# Patient Record
Sex: MALE | Race: WHITE | NOT HISPANIC OR LATINO | Employment: OTHER
[De-identification: names, ages, dates, MRNs, and addresses within clinical notes are randomized per-mention and may not be internally consistent; named-entity substitution may affect disease eponyms.]

---

## 2017-03-17 ENCOUNTER — PRIOR ORIGINAL RECORDS (OUTPATIENT)
Dept: OTHER | Age: 77
End: 2017-03-17

## 2017-07-19 ENCOUNTER — PRIOR ORIGINAL RECORDS (OUTPATIENT)
Dept: OTHER | Age: 77
End: 2017-07-19

## 2017-07-24 ENCOUNTER — TELEPHONE (OUTPATIENT)
Dept: INTERVENTIONAL RADIOLOGY/VASCULAR | Facility: HOSPITAL | Age: 77
End: 2017-07-24

## 2017-07-27 LAB
HDL CHOLESTEROL: 32 MG/DL
LDL CHOLESTEROL: 53 MG/DL
TRIGLYCERIDES: 66 MG/DL

## 2017-08-03 ENCOUNTER — APPOINTMENT (OUTPATIENT)
Dept: GENERAL RADIOLOGY | Facility: HOSPITAL | Age: 77
DRG: 287 | End: 2017-08-03
Attending: EMERGENCY MEDICINE
Payer: MEDICARE

## 2017-08-03 ENCOUNTER — APPOINTMENT (OUTPATIENT)
Dept: CV DIAGNOSTICS | Facility: HOSPITAL | Age: 77
DRG: 287 | End: 2017-08-03
Attending: HOSPITALIST
Payer: MEDICARE

## 2017-08-03 ENCOUNTER — HOSPITAL ENCOUNTER (INPATIENT)
Facility: HOSPITAL | Age: 77
LOS: 4 days | Discharge: HOME | DRG: 287 | End: 2017-08-07
Attending: EMERGENCY MEDICINE | Admitting: HOSPITALIST
Payer: MEDICARE

## 2017-08-03 DIAGNOSIS — R06.02 SHORTNESS OF BREATH: ICD-10-CM

## 2017-08-03 DIAGNOSIS — I50.9 ACUTE ON CHRONIC CONGESTIVE HEART FAILURE, UNSPECIFIED CONGESTIVE HEART FAILURE TYPE: Primary | ICD-10-CM

## 2017-08-03 DIAGNOSIS — R77.8 ELEVATED TROPONIN: ICD-10-CM

## 2017-08-03 PROBLEM — R79.89 ELEVATED TROPONIN: Status: ACTIVE | Noted: 2017-08-03

## 2017-08-03 LAB
ALBUMIN SERPL-MCNC: 4.1 G/DL (ref 3.5–4.8)
ALP LIVER SERPL-CCNC: 38 U/L (ref 45–117)
ALT SERPL-CCNC: 59 U/L (ref 17–63)
APTT PPP: 32.2 SECONDS (ref 25–34)
AST SERPL-CCNC: 37 U/L (ref 15–41)
ATRIAL RATE: 110 BPM
BASOPHILS # BLD AUTO: 0.02 X10(3) UL (ref 0–0.1)
BASOPHILS NFR BLD AUTO: 0.3 %
BILIRUB SERPL-MCNC: 0.4 MG/DL (ref 0.1–2)
BUN BLD-MCNC: 21 MG/DL (ref 8–20)
CALCIUM BLD-MCNC: 9.1 MG/DL (ref 8.3–10.3)
CHLORIDE: 106 MMOL/L (ref 101–111)
CO2: 26 MMOL/L (ref 22–32)
CREAT BLD-MCNC: 0.85 MG/DL (ref 0.7–1.3)
D-DIMER: 0.48 UG/ML FEU (ref 0–0.49)
EOSINOPHIL # BLD AUTO: 0.03 X10(3) UL (ref 0–0.3)
EOSINOPHIL NFR BLD AUTO: 0.4 %
ERYTHROCYTE [DISTWIDTH] IN BLOOD BY AUTOMATED COUNT: 14 % (ref 11.5–16)
GLUCOSE BLD-MCNC: 120 MG/DL (ref 70–99)
HCT VFR BLD AUTO: 39.5 % (ref 37–53)
HGB BLD-MCNC: 13.3 G/DL (ref 13–17)
IMMATURE GRANULOCYTE COUNT: 0.01 X10(3) UL (ref 0–1)
IMMATURE GRANULOCYTE RATIO %: 0.1 %
INR BLD: 1.11 (ref 0.89–1.11)
LYMPHOCYTES # BLD AUTO: 0.81 X10(3) UL (ref 0.9–4)
LYMPHOCYTES NFR BLD AUTO: 11.5 %
M PROTEIN MFR SERPL ELPH: 6.7 G/DL (ref 6.1–8.3)
MCH RBC QN AUTO: 32.9 PG (ref 27–33.2)
MCHC RBC AUTO-ENTMCNC: 33.7 G/DL (ref 31–37)
MCV RBC AUTO: 97.8 FL (ref 80–99)
MONOCYTES # BLD AUTO: 0.69 X10(3) UL (ref 0.1–0.6)
MONOCYTES NFR BLD AUTO: 9.8 %
NEUTROPHIL ABS PRELIM: 5.5 X10 (3) UL (ref 1.3–6.7)
NEUTROPHILS # BLD AUTO: 5.5 X10(3) UL (ref 1.3–6.7)
NEUTROPHILS NFR BLD AUTO: 77.9 %
P AXIS: 60 DEGREES
P-R INTERVAL: 154 MS
PLATELET # BLD AUTO: 181 10(3)UL (ref 150–450)
POTASSIUM SERPL-SCNC: 4.1 MMOL/L (ref 3.6–5.1)
PRO-BETA NATRIURETIC PEPTIDE: 8785 PG/ML (ref ?–450)
PSA SERPL DL<=0.01 NG/ML-MCNC: 14.3 SECONDS (ref 12–14.3)
Q-T INTERVAL: 366 MS
QRS DURATION: 140 MS
QTC CALCULATION (BEZET): 495 MS
R AXIS: -57 DEGREES
RBC # BLD AUTO: 4.04 X10(6)UL (ref 3.8–5.8)
RED CELL DISTRIBUTION WIDTH-SD: 50 FL (ref 35.1–46.3)
SODIUM SERPL-SCNC: 139 MMOL/L (ref 136–144)
T AXIS: 113 DEGREES
TROPONIN: 0.16 NG/ML (ref ?–0.05)
TROPONIN: 0.27 NG/ML (ref ?–0.05)
VENTRICULAR RATE: 110 BPM
WBC # BLD AUTO: 7.1 X10(3) UL (ref 4–13)

## 2017-08-03 PROCEDURE — 99223 1ST HOSP IP/OBS HIGH 75: CPT | Performed by: HOSPITALIST

## 2017-08-03 PROCEDURE — 93306 TTE W/DOPPLER COMPLETE: CPT | Performed by: HOSPITALIST

## 2017-08-03 PROCEDURE — 71020 XR CHEST PA + LAT CHEST (CPT=71020): CPT | Performed by: EMERGENCY MEDICINE

## 2017-08-03 RX ORDER — ASPIRIN 325 MG
325 TABLET ORAL DAILY
Status: DISCONTINUED | OUTPATIENT
Start: 2017-08-03 | End: 2017-08-07

## 2017-08-03 RX ORDER — FUROSEMIDE 10 MG/ML
40 INJECTION INTRAMUSCULAR; INTRAVENOUS ONCE
Status: COMPLETED | OUTPATIENT
Start: 2017-08-03 | End: 2017-08-03

## 2017-08-03 RX ORDER — ASPIRIN 81 MG/1
324 TABLET, CHEWABLE ORAL ONCE
Status: COMPLETED | OUTPATIENT
Start: 2017-08-04 | End: 2017-08-04

## 2017-08-03 RX ORDER — SODIUM CHLORIDE 9 MG/ML
INJECTION, SOLUTION INTRAVENOUS CONTINUOUS
Status: DISCONTINUED | OUTPATIENT
Start: 2017-08-03 | End: 2017-08-03

## 2017-08-03 RX ORDER — ALFUZOSIN HYDROCHLORIDE 10 MG/1
10 TABLET, EXTENDED RELEASE ORAL
Status: DISCONTINUED | OUTPATIENT
Start: 2017-08-03 | End: 2017-08-07

## 2017-08-03 RX ORDER — ACETAMINOPHEN 325 MG/1
650 TABLET ORAL EVERY 6 HOURS PRN
Status: DISCONTINUED | OUTPATIENT
Start: 2017-08-03 | End: 2017-08-07

## 2017-08-03 RX ORDER — SODIUM CHLORIDE 9 MG/ML
INJECTION, SOLUTION INTRAVENOUS CONTINUOUS
Status: DISCONTINUED | OUTPATIENT
Start: 2017-08-04 | End: 2017-08-04

## 2017-08-03 RX ORDER — BUTALBITAL, ASPIRIN, AND CAFFEINE 50; 325; 40 MG/1; MG/1; MG/1
1 CAPSULE ORAL EVERY 6 HOURS PRN
Status: DISCONTINUED | OUTPATIENT
Start: 2017-08-03 | End: 2017-08-07

## 2017-08-03 RX ORDER — ONDANSETRON 2 MG/ML
4 INJECTION INTRAMUSCULAR; INTRAVENOUS EVERY 6 HOURS PRN
Status: DISCONTINUED | OUTPATIENT
Start: 2017-08-03 | End: 2017-08-07

## 2017-08-03 RX ORDER — HYDROMORPHONE HYDROCHLORIDE 1 MG/ML
1 INJECTION, SOLUTION INTRAMUSCULAR; INTRAVENOUS; SUBCUTANEOUS ONCE
Status: COMPLETED | OUTPATIENT
Start: 2017-08-03 | End: 2017-08-03

## 2017-08-03 RX ORDER — ZOLPIDEM TARTRATE 10 MG/1
10 TABLET ORAL NIGHTLY
Status: DISCONTINUED | OUTPATIENT
Start: 2017-08-03 | End: 2017-08-07

## 2017-08-03 RX ORDER — BACLOFEN 20 MG/1
20 TABLET ORAL 4 TIMES DAILY
Status: DISCONTINUED | OUTPATIENT
Start: 2017-08-03 | End: 2017-08-07

## 2017-08-03 RX ORDER — CODEINE/BUTALBITAL/ASA/CAFFEIN 30-50-325
1 CAPSULE ORAL EVERY 6 HOURS PRN
Status: DISCONTINUED | OUTPATIENT
Start: 2017-08-03 | End: 2017-08-03 | Stop reason: SDUPTHER

## 2017-08-03 RX ORDER — AMLODIPINE BESYLATE 5 MG/1
10 TABLET ORAL DAILY
Status: DISCONTINUED | OUTPATIENT
Start: 2017-08-03 | End: 2017-08-05

## 2017-08-03 RX ORDER — DIAZEPAM 5 MG/1
5 TABLET ORAL 2 TIMES DAILY
Status: DISCONTINUED | OUTPATIENT
Start: 2017-08-03 | End: 2017-08-07

## 2017-08-03 RX ORDER — HYDROMORPHONE HYDROCHLORIDE 2 MG/1
8 TABLET ORAL EVERY 4 HOURS PRN
Status: DISCONTINUED | OUTPATIENT
Start: 2017-08-03 | End: 2017-08-07

## 2017-08-03 RX ORDER — CODEINE SULFATE 30 MG/1
30 TABLET ORAL EVERY 6 HOURS PRN
Status: DISCONTINUED | OUTPATIENT
Start: 2017-08-03 | End: 2017-08-07

## 2017-08-03 RX ORDER — BACLOFEN 20 MG/1
20 TABLET ORAL 3 TIMES DAILY
Status: DISCONTINUED | OUTPATIENT
Start: 2017-08-03 | End: 2017-08-03

## 2017-08-03 RX ORDER — ENOXAPARIN SODIUM 100 MG/ML
40 INJECTION SUBCUTANEOUS DAILY
Status: DISCONTINUED | OUTPATIENT
Start: 2017-08-03 | End: 2017-08-07

## 2017-08-03 NOTE — HISTORICAL OFFICE NOTE
John Loo  : 1940  ACCOUNT:  [id number]  [phone]  PCP: Dr. Carole Ozuna     TODAY'S DATE: 2017  DICTATED BY:  Marshall Tillman M.D. ]    CHIEF COMPLAINT: [Followup of Aneurysm, thoracic and Followup of Bicuspid aortic valve.]    HPI: Selected prescriptions see below    VITAL SIGNS: [B/P - 104/76 , Pulse - 92, Weight -  148, Height -   68 , BMI - 22.5 ]    CONS: in no distress and pleasant. WEIGHT: BMI parameters reviewed and discussed. HEAD/FACE: normocephalic.  ENT: mucosa pink and zaria Ibuprofen             600MG     as needed                                03/06/14 Norvasc               10MG      one tablets daily                        03/06/14 Stool Softener        100MG     daily                                    05/17/17 HYDROMORPH

## 2017-08-03 NOTE — H&P
[facility] HOSPITALIST  History and Physical     Celena Money Patient Status:  Observation    1940 MRN [id number]   [facility] 2NE-A Attending Nette Swartz MD   Hosp Day # 0 PCP Beryle Session, MD     Chief Complaint: Dyspnea    H COLONOSCOPY,DIAGNOSTIC      Comment: (use MAC sedation in the future) TI wnl, 3                cecal, 4 hepatic, 2 sigmoid polyps                (adenomatous), diverticulosis  7/20/16: ENDOSCOPIC ULTRASOUND EXAM      Comment: Prominent pancreatic duct and (FLOMAX) 0.4 MG Oral Cap Take  by mouth 2 (two) times daily. Disp:  Rfl:    Ibuprofen 200 MG Oral Tab Take 200 mg by mouth every 6 (six) hours as needed. Disp:  Rfl:        Review of Systems:   A comprehensive 14 point review of systems was completed.     P a better picture of his echocardiogram and aortic valve. [facility] consulted. 2. Chest pain:  Repeat troponin. 3. Bicuspid aortic valve: Echo as above  4. Hypertension: Continue with Norvasc  5. BPH:  Continue with home meds  6.  Chronic back pain    Quality:

## 2017-08-04 ENCOUNTER — APPOINTMENT (OUTPATIENT)
Dept: INTERVENTIONAL RADIOLOGY/VASCULAR | Facility: HOSPITAL | Age: 77
DRG: 287 | End: 2017-08-04
Attending: INTERNAL MEDICINE
Payer: MEDICARE

## 2017-08-04 LAB
BASOPHILS # BLD AUTO: 0.01 X10(3) UL (ref 0–0.1)
BASOPHILS NFR BLD AUTO: 0.2 %
BUN BLD-MCNC: 27 MG/DL (ref 8–20)
CALCIUM BLD-MCNC: 8.6 MG/DL (ref 8.3–10.3)
CHLORIDE: 103 MMOL/L (ref 101–111)
CO2: 27 MMOL/L (ref 22–32)
CREAT BLD-MCNC: 0.8 MG/DL (ref 0.7–1.3)
EOSINOPHIL # BLD AUTO: 0.08 X10(3) UL (ref 0–0.3)
EOSINOPHIL NFR BLD AUTO: 1.2 %
ERYTHROCYTE [DISTWIDTH] IN BLOOD BY AUTOMATED COUNT: 13.7 % (ref 11.5–16)
GLUCOSE BLD-MCNC: 97 MG/DL (ref 70–99)
HCT VFR BLD AUTO: 38.9 % (ref 37–53)
HGB BLD-MCNC: 13.2 G/DL (ref 13–17)
IMMATURE GRANULOCYTE COUNT: 0.02 X10(3) UL (ref 0–1)
IMMATURE GRANULOCYTE RATIO %: 0.3 %
LYMPHOCYTES # BLD AUTO: 1.15 X10(3) UL (ref 0.9–4)
LYMPHOCYTES NFR BLD AUTO: 17.9 %
MCH RBC QN AUTO: 32.8 PG (ref 27–33.2)
MCHC RBC AUTO-ENTMCNC: 33.9 G/DL (ref 31–37)
MCV RBC AUTO: 96.5 FL (ref 80–99)
MONOCYTES # BLD AUTO: 0.84 X10(3) UL (ref 0.1–0.6)
MONOCYTES NFR BLD AUTO: 13.1 %
NEUTROPHIL ABS PRELIM: 4.33 X10 (3) UL (ref 1.3–6.7)
NEUTROPHILS # BLD AUTO: 4.33 X10(3) UL (ref 1.3–6.7)
NEUTROPHILS NFR BLD AUTO: 67.3 %
PLATELET # BLD AUTO: 176 10(3)UL (ref 150–450)
POTASSIUM SERPL-SCNC: 3.9 MMOL/L (ref 3.6–5.1)
RBC # BLD AUTO: 4.03 X10(6)UL (ref 3.8–5.8)
RED CELL DISTRIBUTION WIDTH-SD: 49.1 FL (ref 35.1–46.3)
SODIUM SERPL-SCNC: 137 MMOL/L (ref 136–144)
WBC # BLD AUTO: 6.4 X10(3) UL (ref 4–13)

## 2017-08-04 PROCEDURE — B2111ZZ FLUOROSCOPY OF MULTIPLE CORONARY ARTERIES USING LOW OSMOLAR CONTRAST: ICD-10-PCS | Performed by: INTERNAL MEDICINE

## 2017-08-04 PROCEDURE — 99232 SBSQ HOSP IP/OBS MODERATE 35: CPT | Performed by: HOSPITALIST

## 2017-08-04 PROCEDURE — 4A023N8 MEASUREMENT OF CARDIAC SAMPLING AND PRESSURE, BILATERAL, PERCUTANEOUS APPROACH: ICD-10-PCS | Performed by: INTERNAL MEDICINE

## 2017-08-04 RX ORDER — SODIUM CHLORIDE 9 MG/ML
INJECTION, SOLUTION INTRAVENOUS CONTINUOUS
Status: DISCONTINUED | OUTPATIENT
Start: 2017-08-04 | End: 2017-08-05

## 2017-08-04 RX ORDER — FUROSEMIDE 10 MG/ML
40 INJECTION INTRAMUSCULAR; INTRAVENOUS ONCE
Status: COMPLETED | OUTPATIENT
Start: 2017-08-04 | End: 2017-08-04

## 2017-08-04 RX ORDER — LIDOCAINE HYDROCHLORIDE 10 MG/ML
INJECTION, SOLUTION INFILTRATION; PERINEURAL
Status: COMPLETED
Start: 2017-08-04 | End: 2017-08-04

## 2017-08-04 RX ORDER — MIDAZOLAM HYDROCHLORIDE 1 MG/ML
INJECTION INTRAMUSCULAR; INTRAVENOUS
Status: COMPLETED
Start: 2017-08-04 | End: 2017-08-04

## 2017-08-04 RX ORDER — SODIUM CHLORIDE 9 MG/ML
INJECTION, SOLUTION INTRAVENOUS CONTINUOUS
Status: DISCONTINUED | OUTPATIENT
Start: 2017-08-04 | End: 2017-08-04

## 2017-08-04 RX ORDER — HEPARIN SODIUM 5000 [USP'U]/ML
INJECTION, SOLUTION INTRAVENOUS; SUBCUTANEOUS
Status: COMPLETED
Start: 2017-08-04 | End: 2017-08-04

## 2017-08-04 RX ORDER — ASPIRIN 81 MG/1
TABLET, CHEWABLE ORAL
Status: DISCONTINUED
Start: 2017-08-04 | End: 2017-08-04

## 2017-08-04 NOTE — CONSULTS
[facility]  Cardiology Consultation    Earle Gaxiola [facility] Patient Status:  Inpatient    1940 MRN [id number]   [facility] 2NE-A Attending Sarah Zamorano MD   Hosp Day # 0 PCP Linda Hook MD     Reason for Consultation:  Dyspnea without evidence of a mass lesion.   No date: NECK SPINE FUSE&REMOVE ADDL      Comment: cervical  No date: OTHER SURGICAL HISTORY      Comment: carpal tunnel  No date: TONSILLECTOMY  7/20/16: UPPER GI ENDOSCOPY,DIAGNOSIS      Comment: antral erosions 21.49 kg/m²   Temp (24hrs), Av.4 °F (36.9 °C), Min:98.2 °F (36.8 °C), Max:98.8 °F (37.1 °C)       Intake/Output Summary (Last 24 hours) at 17  Last data filed at 17   Gross per 24 hour   Intake              240 ml   Output Mild regurgitation. Mean gradient (S): 52mm Hg. 3. Aorta: Aortic root dimension: 4.8cm (ED). Ascending aortic diameter: 5.2cm (S). 4. Aortic root: The aortic root was dilated. 5. Ascending aorta: The ascending aorta was dilated.   6. Left atrium: The le reaction to medications, vascular damage requiring surgical repair, kidney failure requiring dialysis and others. Patient wishes to proceed. Thank you for allowing me to participate in the care of your patient.     Denzel Yan MD  8/3/2017  8:21 PM

## 2017-08-04 NOTE — PROCEDURES
[facility]    PATIENT'S NAME: Marleni Coleman   ATTENDING PHYSICIAN: Los Hughes M.D.    OPERATING PHYSICIAN: Francisco Owen MD   PATIENT ACCOUNT#:   [id number]    LOCATION:  [address] [place] [address]  MEDICAL RECORD #:   [id number]       DATE OF BIRTH:  [birth year] 1032 to 1100. COMPLICATIONS:  None. ESTIMATED BLOOD LOSS:  2 mL. CONCLUSIONS:    1. Minimal coronary artery disease. 2.   Mild pulmonary hypertension.     Dictated By Sherri Macias MD  d: 08/04/2017 11:19:41  t: 08/04/2017 11:40:12  [facility] [phone]

## 2017-08-04 NOTE — PROGRESS NOTES
[facility] HOSPITALIST  Progress Note     Christopher Hinson Patient Status:  Inpatient    1940 MRN [id number]   [facility] 2NE-A Attending Monica Harrington MD   Hosp Day # 1 PCP Prachi Reyes MD     Chief Complaint: Dyspnea    S: Patient fe Tartrate  10 mg Oral Nightly   • AmLODIPine Besylate  10 mg Oral Daily   • enoxaparin  40 mg Subcutaneous Daily   • baclofen  20 mg Oral QID       ASSESSMENT / PLAN:     1. Acute Systolic HF:  Improved, iv lasix, RHC/LHC noted  2.  Bicuspid aortic valve:  T

## 2017-08-04 NOTE — PROGRESS NOTES
[facility]  Progress Note    Verónica Beyer Patient Status:  Inpatient    1940 MRN [id number]   [facility] 2NE-A Attending Melani Cardenas MD   Hosp Day # 1 PCP Jason Nevarez MD       Assessment:    · AS  · CAD, minimal non-obs AmLODIPine Besylate  10 mg Oral Daily   • enoxaparin  40 mg Subcutaneous Daily   • baclofen  20 mg Oral QID     • sodium chloride 20 mL/hr at 08/04/17 7611         Adia Stanley MD  8/4/2017  11:32 AM

## 2017-08-04 NOTE — PLAN OF CARE
Problem: PAIN - ADULT  Goal: Verbalizes/displays adequate comfort level or patient's stated pain goal  INTERVENTIONS:  - Encourage pt to monitor pain and request assistance  - Assess pain using appropriate pain scale  - Administer analgesics based on type Oxygen supplementation based on oxygen saturation or ABGs  - Provide Smoking Cessation handout, if applicable  - Encourage broncho-pulmonary hygiene including cough, deep breathe, Incentive Spirometry  - Assess the need for suctioning and perform as needed

## 2017-08-05 LAB
ATRIAL RATE: 93 BPM
BUN BLD-MCNC: 26 MG/DL (ref 8–20)
CALCIUM BLD-MCNC: 8.4 MG/DL (ref 8.3–10.3)
CHLORIDE: 101 MMOL/L (ref 101–111)
CO2: 28 MMOL/L (ref 22–32)
CREAT BLD-MCNC: 0.84 MG/DL (ref 0.7–1.3)
GLUCOSE BLD-MCNC: 91 MG/DL (ref 70–99)
P AXIS: 50 DEGREES
P-R INTERVAL: 172 MS
POTASSIUM SERPL-SCNC: 3.8 MMOL/L (ref 3.6–5.1)
Q-T INTERVAL: 412 MS
QRS DURATION: 144 MS
QTC CALCULATION (BEZET): 512 MS
R AXIS: -57 DEGREES
SODIUM SERPL-SCNC: 136 MMOL/L (ref 136–144)
T AXIS: 113 DEGREES
VENTRICULAR RATE: 93 BPM

## 2017-08-05 PROCEDURE — 99232 SBSQ HOSP IP/OBS MODERATE 35: CPT | Performed by: HOSPITALIST

## 2017-08-05 RX ORDER — LACTULOSE 10 G/15ML
20 SOLUTION ORAL DAILY PRN
Status: DISCONTINUED | OUTPATIENT
Start: 2017-08-05 | End: 2017-08-07

## 2017-08-05 RX ORDER — CARVEDILOL 3.12 MG/1
3.12 TABLET ORAL 2 TIMES DAILY WITH MEALS
Status: DISCONTINUED | OUTPATIENT
Start: 2017-08-05 | End: 2017-08-07

## 2017-08-05 RX ORDER — BACITRACIN 500 [USP'U]/G
OINTMENT TOPICAL AS NEEDED
Status: DISCONTINUED | OUTPATIENT
Start: 2017-08-05 | End: 2017-08-05

## 2017-08-05 RX ORDER — FUROSEMIDE 20 MG/1
20 TABLET ORAL DAILY
Status: DISCONTINUED | OUTPATIENT
Start: 2017-08-05 | End: 2017-08-07

## 2017-08-05 RX ORDER — FUROSEMIDE 10 MG/ML
40 INJECTION INTRAMUSCULAR; INTRAVENOUS ONCE
Status: COMPLETED | OUTPATIENT
Start: 2017-08-05 | End: 2017-08-05

## 2017-08-05 RX ORDER — BACITRACIN 500 [USP'U]/G
OINTMENT TOPICAL DAILY
Status: DISCONTINUED | OUTPATIENT
Start: 2017-08-05 | End: 2017-08-07

## 2017-08-05 NOTE — PROGRESS NOTES
· [facility] Cardiology Progress Note     Subjective:  Dyspnea improved.    No dizziness with low BPs overnight    Objective:  99/70, was 80s overnight   I/O incomplete   BUN/cr 26/0.84    Neuro:awake/alert  HEENT:no JVD  Cardiac:S1 S2 regular with 3/6 sy

## 2017-08-05 NOTE — PROGRESS NOTES
[facility] HOSPITALIST  Progress Note     Kj Madinamarly Patient Status:  Inpatient    1940 MRN [id number]   [facility] 2NE-A Attending Yani Paige MD   Hosp Day # 2 PCP Breana Mccracken MD     Chief Complaint: Dyspnea    S: Patient fe data reviewed in Epic.     Medications:   • zinc oxide   Topical Daily   • aspirin  325 mg Oral Daily   • diazepam  5 mg Oral BID   • Alfuzosin HCl ER  10 mg Oral Daily with breakfast   • Zolpidem Tartrate  10 mg Oral Nightly   • enoxaparin  40 mg Subcutane

## 2017-08-05 NOTE — PLAN OF CARE
S, Dr Gopi pruitt for trending low BP. He had been 90's//60's then high  80's/50's and stayed now in low 80's now 73/55 asymptomatic laying 20 degrees in bed.   R groin soft without any drainage and entire lower abd soft without any bruising noted either a

## 2017-08-06 LAB
BUN BLD-MCNC: 27 MG/DL (ref 8–20)
CALCIUM BLD-MCNC: 8.3 MG/DL (ref 8.3–10.3)
CHLORIDE: 98 MMOL/L (ref 101–111)
CO2: 28 MMOL/L (ref 22–32)
CREAT BLD-MCNC: 0.76 MG/DL (ref 0.7–1.3)
GLUCOSE BLD-MCNC: 94 MG/DL (ref 70–99)
POTASSIUM SERPL-SCNC: 3.7 MMOL/L (ref 3.6–5.1)
SODIUM SERPL-SCNC: 134 MMOL/L (ref 136–144)

## 2017-08-06 PROCEDURE — 99232 SBSQ HOSP IP/OBS MODERATE 35: CPT | Performed by: HOSPITALIST

## 2017-08-06 RX ORDER — BISACODYL 10 MG
10 SUPPOSITORY, RECTAL RECTAL ONCE
Status: COMPLETED | OUTPATIENT
Start: 2017-08-06 | End: 2017-08-06

## 2017-08-06 RX ORDER — POTASSIUM CHLORIDE 20 MEQ/1
40 TABLET, EXTENDED RELEASE ORAL ONCE
Status: COMPLETED | OUTPATIENT
Start: 2017-08-06 | End: 2017-08-06

## 2017-08-06 NOTE — PROGRESS NOTES
[facility] HOSPITALIST  Progress Note     Shruthi Weinstein Patient Status:  Inpatient    1940 MRN [id number]   [facility] [place] [facility] 2NE-A Attending Krys Winter MD   Hosp Day # 3 PCP Kenneth Mcdowell MD     Chief Complaint: Dyspnea    S: Patient st 40 mg Subcutaneous Daily   • baclofen  20 mg Oral QID       ASSESSMENT / PLAN:     1. Acute Systolic HF:  Improved, diuretics per cards, RHC/LHC noted-will monitor on coreg another 24 hours  2. Bicuspid aortic valve:  TAVR clinic f/u after d/c  3. HTN  4.

## 2017-08-06 NOTE — PROGRESS NOTES
· [facility] Card[facility] Progress Note     Subjective:  No dyspnea, feels ready for discharhe    Objective:  115/80  Afebrile  SR    I/O -1655   BUN/cr 27/0.76    Neuro:awake/alert  HEENT:no JVD  Cardiac:S1 S2 regular with 3/6 systolic murmur  Lungs: crac

## 2017-08-07 VITALS
DIASTOLIC BLOOD PRESSURE: 70 MMHG | HEART RATE: 92 BPM | WEIGHT: 139.75 LBS | RESPIRATION RATE: 20 BRPM | TEMPERATURE: 99 F | OXYGEN SATURATION: 98 % | SYSTOLIC BLOOD PRESSURE: 100 MMHG | HEIGHT: 68 IN | BODY MASS INDEX: 21.18 KG/M2

## 2017-08-07 LAB — POTASSIUM SERPL-SCNC: 4.1 MMOL/L (ref 3.6–5.1)

## 2017-08-07 PROCEDURE — 99239 HOSP IP/OBS DSCHRG MGMT >30: CPT | Performed by: INTERNAL MEDICINE

## 2017-08-07 RX ORDER — FUROSEMIDE 20 MG/1
20 TABLET ORAL DAILY
Qty: 30 TABLET | Refills: 4 | Status: ON HOLD | OUTPATIENT
Start: 2017-08-07 | End: 2017-08-24

## 2017-08-07 RX ORDER — CARVEDILOL 3.12 MG/1
3.12 TABLET ORAL 2 TIMES DAILY WITH MEALS
Qty: 60 TABLET | Refills: 6 | Status: ON HOLD | OUTPATIENT
Start: 2017-08-07 | End: 2017-08-24

## 2017-08-07 NOTE — PROGRESS NOTES
Cardiology Progress Note     PRIMARY CARDIOLOGIST: [facility]      CONSULT FOR: BICUSPID AV STENOSIS  SEVERE WITH ASCENDING AO ROOT ANEURYSM, EF AT 15% AND NEW CHF       SUBJECTIVE: IMPROVED SOB AFTER DIURESIS AND CHANGE TO COREG      Scheduled Meds:   • and sensation throughout. Data Review:     HEM: No results for input(s): WBC, HGB, PLT, BANDSPCT, LYMPHOPCT, MONOPCT, INR in the last 72 hours.     Invalid input(s): NEUTOPHILPCT, EOSPCT    Chem: Recent Labs   Lab  08/05/17   0532  08/06/17   0534  08

## 2017-08-07 NOTE — PROGRESS NOTES
[facility] HOSPITALIST  Progress Note     Martha Monae Patient Status:  Inpatient    1940 MRN [id number]   [facility] 2NE-A Attending Mayra Mendez MD   Hosp Day # 4 PCP Jayden Gaxiola MD     Chief Complaint: Dyspnea    S: Patient wi baclofen  20 mg Oral QID       ASSESSMENT / PLAN:     1. Acute Systolic HF  1. Improved,   2. diuretics per cards  3. RHC/LHC noted  4. Continue BB  5. Will need outpatient eval for Surgical approach  2.  Bicuspid aortic valve:  TAVR clinic f/u after d/c  3

## 2017-08-07 NOTE — DISCHARGE SUMMARY
[facility] HOSPITALIST  DISCHARGE SUMMARY     Gunnar Ohara Patient Status:  Inpatient    1940 MRN [id number]   [facility] 2NE-A Attending Brooke Browning MD   [facility] Day # 4 PCP Edson Fitzgerald MD     Date of Admission: 8/3/2017  Date of his symptoms. He also endorses complaints of chest pain which radiated from the right to the left in the emergency department this has since resolved.       Brief Synopsis: Pt was admitted and monitored on telemetry.  He was seen  By cardiology and had MIDRIN OR      Take 1 tablet by mouth as needed. Refills:  0     Senna 8.6 MG Tabs  Commonly known as:  SENOKOT      Take 8.6 mg by mouth nightly.    Refills:  0        STOP taking these medications    ibuprofen 200 MG Tabs  Commonly known as:  MOTRIN

## 2017-08-08 ENCOUNTER — PRIOR ORIGINAL RECORDS (OUTPATIENT)
Dept: OTHER | Age: 77
End: 2017-08-08

## 2017-08-09 ENCOUNTER — PRIOR ORIGINAL RECORDS (OUTPATIENT)
Dept: OTHER | Age: 77
End: 2017-08-09

## 2017-08-12 ENCOUNTER — HOSPITAL ENCOUNTER (EMERGENCY)
Facility: HOSPITAL | Age: 77
Discharge: HOME | End: 2017-08-12
Attending: EMERGENCY MEDICINE
Payer: MEDICARE

## 2017-08-12 ENCOUNTER — APPOINTMENT (OUTPATIENT)
Dept: GENERAL RADIOLOGY | Facility: HOSPITAL | Age: 77
End: 2017-08-12
Attending: EMERGENCY MEDICINE
Payer: MEDICARE

## 2017-08-12 VITALS
RESPIRATION RATE: 22 BRPM | BODY MASS INDEX: 22.43 KG/M2 | TEMPERATURE: 99 F | DIASTOLIC BLOOD PRESSURE: 68 MMHG | HEART RATE: 88 BPM | SYSTOLIC BLOOD PRESSURE: 97 MMHG | HEIGHT: 68 IN | OXYGEN SATURATION: 95 % | WEIGHT: 148 LBS

## 2017-08-12 DIAGNOSIS — R06.00 DYSPNEA, UNSPECIFIED TYPE: Primary | ICD-10-CM

## 2017-08-12 LAB
ALBUMIN SERPL-MCNC: 3.6 G/DL (ref 3.5–4.8)
ALP LIVER SERPL-CCNC: 44 U/L (ref 45–117)
ALT SERPL-CCNC: 56 U/L (ref 17–63)
APTT PPP: 33.8 SECONDS (ref 25–34)
AST SERPL-CCNC: 27 U/L (ref 15–41)
BASOPHILS # BLD AUTO: 0.03 X10(3) UL (ref 0–0.1)
BASOPHILS NFR BLD AUTO: 0.4 %
BILIRUB SERPL-MCNC: 0.6 MG/DL (ref 0.1–2)
BUN BLD-MCNC: 25 MG/DL (ref 8–20)
CALCIUM BLD-MCNC: 8.6 MG/DL (ref 8.3–10.3)
CHLORIDE: 100 MMOL/L (ref 101–111)
CO2: 24 MMOL/L (ref 22–32)
CREAT BLD-MCNC: 0.96 MG/DL (ref 0.7–1.3)
EOSINOPHIL # BLD AUTO: 0.05 X10(3) UL (ref 0–0.3)
EOSINOPHIL NFR BLD AUTO: 0.7 %
ERYTHROCYTE [DISTWIDTH] IN BLOOD BY AUTOMATED COUNT: 13.3 % (ref 11.5–16)
GLUCOSE BLD-MCNC: 108 MG/DL (ref 70–99)
HCT VFR BLD AUTO: 38 % (ref 37–53)
HGB BLD-MCNC: 13.2 G/DL (ref 13–17)
IMMATURE GRANULOCYTE COUNT: 0.02 X10(3) UL (ref 0–1)
IMMATURE GRANULOCYTE RATIO %: 0.3 %
INR BLD: 1.22 (ref 0.89–1.11)
LYMPHOCYTES # BLD AUTO: 1.15 X10(3) UL (ref 0.9–4)
LYMPHOCYTES NFR BLD AUTO: 16.8 %
M PROTEIN MFR SERPL ELPH: 6 G/DL (ref 6.1–8.3)
MCH RBC QN AUTO: 32.6 PG (ref 27–33.2)
MCHC RBC AUTO-ENTMCNC: 34.7 G/DL (ref 31–37)
MCV RBC AUTO: 93.8 FL (ref 80–99)
MONOCYTES # BLD AUTO: 0.8 X10(3) UL (ref 0.1–0.6)
MONOCYTES NFR BLD AUTO: 11.7 %
NEUTROPHIL ABS PRELIM: 4.81 X10 (3) UL (ref 1.3–6.7)
NEUTROPHILS # BLD AUTO: 4.81 X10(3) UL (ref 1.3–6.7)
NEUTROPHILS NFR BLD AUTO: 70.1 %
PLATELET # BLD AUTO: 184 10(3)UL (ref 150–450)
POTASSIUM SERPL-SCNC: 4.2 MMOL/L (ref 3.6–5.1)
PSA SERPL DL<=0.01 NG/ML-MCNC: 15.5 SECONDS (ref 12–14.3)
RBC # BLD AUTO: 4.05 X10(6)UL (ref 3.8–5.8)
RED CELL DISTRIBUTION WIDTH-SD: 45.7 FL (ref 35.1–46.3)
SODIUM SERPL-SCNC: 132 MMOL/L (ref 136–144)
TROPONIN: 1 NG/ML (ref ?–0.05)
WBC # BLD AUTO: 6.9 X10(3) UL (ref 4–13)

## 2017-08-12 PROCEDURE — 71010 XR CHEST AP PORTABLE  (CPT=71010): CPT | Performed by: EMERGENCY MEDICINE

## 2017-08-12 PROCEDURE — 93010 ELECTROCARDIOGRAM REPORT: CPT

## 2017-08-12 PROCEDURE — 93005 ELECTROCARDIOGRAM TRACING: CPT

## 2017-08-12 PROCEDURE — 85610 PROTHROMBIN TIME: CPT | Performed by: EMERGENCY MEDICINE

## 2017-08-12 PROCEDURE — 84484 ASSAY OF TROPONIN QUANT: CPT | Performed by: EMERGENCY MEDICINE

## 2017-08-12 PROCEDURE — 99285 EMERGENCY DEPT VISIT HI MDM: CPT

## 2017-08-12 PROCEDURE — 85730 THROMBOPLASTIN TIME PARTIAL: CPT | Performed by: EMERGENCY MEDICINE

## 2017-08-12 PROCEDURE — 96374 THER/PROPH/DIAG INJ IV PUSH: CPT

## 2017-08-12 PROCEDURE — 80053 COMPREHEN METABOLIC PANEL: CPT | Performed by: EMERGENCY MEDICINE

## 2017-08-12 PROCEDURE — 85025 COMPLETE CBC W/AUTO DIFF WBC: CPT | Performed by: EMERGENCY MEDICINE

## 2017-08-12 RX ORDER — FUROSEMIDE 10 MG/ML
20 INJECTION INTRAMUSCULAR; INTRAVENOUS ONCE
Status: COMPLETED | OUTPATIENT
Start: 2017-08-12 | End: 2017-08-12

## 2017-08-12 NOTE — ED NOTES
Round on pt. No distress noted. Pt denies any needs. Spouse at bedside. Pt pending disposition.    Pt has not voided since lasix administration

## 2017-08-12 NOTE — ED PROVIDER NOTES
Patient Seen in: [facility] Emergency Department    History   Patient presents with:  Chest Pain Angina (cardiovascular)    Stated Complaint: CP    HPI    Grace Gordon is a 67-year-old gentleman coming with complaints of dyspnea and chest pain.   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    fu patient is alert and oriented ×3 and appears in no distress  HEENT exam: Tympanic membranes are clear. Canals are normal with no auricular preauricular or mastoid tenderness. Oropharyngeal exam shows no uvula edema or shift.   There is no tongue elevation -----------         ------                     CBC W/ DIFFERENTIAL[[id number]]          Abnormal            Final result                 Please view results for these tests on the individual orders.    RAINBOW DRAW BLUE   RAINBOW DRAW GOLD   RAINBOW DRAW LAV

## 2017-08-12 NOTE — ED NOTES
Round on pt. No distress noted. Spouse at bedside. Pt denies any needs at this time. Updated on poc.

## 2017-08-12 NOTE — ED NOTES
Ready for XR     Pt sts he has an apt on Monday with Dr Vandana Unger. Sts he needs a valve replaced.

## 2017-08-13 LAB
ATRIAL RATE: 97 BPM
P AXIS: 42 DEGREES
P-R INTERVAL: 162 MS
Q-T INTERVAL: 416 MS
QRS DURATION: 150 MS
QTC CALCULATION (BEZET): 528 MS
R AXIS: -60 DEGREES
T AXIS: 110 DEGREES
VENTRICULAR RATE: 97 BPM

## 2017-08-14 ENCOUNTER — HOSPITAL ENCOUNTER (OUTPATIENT)
Dept: LAB | Facility: HOSPITAL | Age: 77
Discharge: HOME | DRG: 220 | End: 2017-08-14
Attending: THORACIC SURGERY (CARDIOTHORACIC VASCULAR SURGERY)
Payer: MEDICARE

## 2017-08-14 ENCOUNTER — TELEPHONE (OUTPATIENT)
Dept: CARDIOLOGY UNIT | Facility: HOSPITAL | Age: 77
End: 2017-08-14

## 2017-08-14 DIAGNOSIS — Z01.818 PRE-OP EVALUATION: ICD-10-CM

## 2017-08-14 LAB
ANTIBODY SCREEN: NEGATIVE
BILIRUB UR QL STRIP.AUTO: NEGATIVE
COLOR UR AUTO: YELLOW
EST. AVERAGE GLUCOSE BLD GHB EST-MCNC: 111 MG/DL (ref 68–126)
GLUCOSE UR STRIP.AUTO-MCNC: NEGATIVE MG/DL
HBA1C MFR BLD HPLC: 5.5 % (ref ?–5.7)
KETONES UR STRIP.AUTO-MCNC: NEGATIVE MG/DL
LEUKOCYTE ESTERASE UR QL STRIP.AUTO: NEGATIVE
NITRITE UR QL STRIP.AUTO: NEGATIVE
PH UR STRIP.AUTO: 5 [PH] (ref 4.5–8)
PROT UR STRIP.AUTO-MCNC: NEGATIVE MG/DL
RBC UR QL AUTO: NEGATIVE
RH BLOOD TYPE: POSITIVE
SP GR UR STRIP.AUTO: 1.02 (ref 1–1.03)
UROBILINOGEN UR STRIP.AUTO-MCNC: <2 MG/DL

## 2017-08-14 PROCEDURE — 83036 HEMOGLOBIN GLYCOSYLATED A1C: CPT

## 2017-08-14 PROCEDURE — 86850 RBC ANTIBODY SCREEN: CPT

## 2017-08-14 PROCEDURE — 36415 COLL VENOUS BLD VENIPUNCTURE: CPT

## 2017-08-14 PROCEDURE — 81003 URINALYSIS AUTO W/O SCOPE: CPT

## 2017-08-14 PROCEDURE — 86900 BLOOD TYPING SEROLOGIC ABO: CPT

## 2017-08-14 PROCEDURE — 86901 BLOOD TYPING SEROLOGIC RH(D): CPT

## 2017-08-14 NOTE — PROGRESS NOTES
Pt seen in office by Dr. Heraclio Meehan, tentative surgery Thursday, all PATs done, binder given, brief pre op teaching and all questions answered, per Dr. Heraclio Meehan carotid u/s not needed. Advised to call with any further questions or concerns.   Barbara Correa RN  Clinical

## 2017-08-15 ENCOUNTER — PRIOR ORIGINAL RECORDS (OUTPATIENT)
Dept: OTHER | Age: 77
End: 2017-08-15

## 2017-08-16 ENCOUNTER — ANESTHESIA EVENT (OUTPATIENT)
Dept: CARDIAC SURGERY | Facility: HOSPITAL | Age: 77
DRG: 220 | End: 2017-08-16
Payer: MEDICARE

## 2017-08-17 ENCOUNTER — SURGERY (OUTPATIENT)
Age: 77
End: 2017-08-17

## 2017-08-17 ENCOUNTER — ANESTHESIA (OUTPATIENT)
Dept: CARDIAC SURGERY | Facility: HOSPITAL | Age: 77
DRG: 220 | End: 2017-08-17
Payer: MEDICARE

## 2017-08-17 ENCOUNTER — APPOINTMENT (OUTPATIENT)
Dept: GENERAL RADIOLOGY | Facility: HOSPITAL | Age: 77
DRG: 220 | End: 2017-08-17
Attending: THORACIC SURGERY (CARDIOTHORACIC VASCULAR SURGERY)
Payer: MEDICARE

## 2017-08-17 ENCOUNTER — HOSPITAL ENCOUNTER (INPATIENT)
Facility: HOSPITAL | Age: 77
LOS: 7 days | Discharge: HOME HEALTH | DRG: 220 | End: 2017-08-24
Attending: THORACIC SURGERY (CARDIOTHORACIC VASCULAR SURGERY) | Admitting: THORACIC SURGERY (CARDIOTHORACIC VASCULAR SURGERY)
Payer: MEDICARE

## 2017-08-17 DIAGNOSIS — Z95.2 S/P AVR: Primary | ICD-10-CM

## 2017-08-17 DIAGNOSIS — I35.0 AORTIC VALVE STENOSIS, ETIOLOGY OF CARDIAC VALVE DISEASE UNSPECIFIED: ICD-10-CM

## 2017-08-17 LAB
APTT PPP: 36 SECONDS (ref 25–34)
APTT PPP: 42.4 SECONDS (ref 25–34)
APTT PPP: 44.3 SECONDS (ref 25–34)
ARTERIAL BLD GAS O2 SATURATION: 96 % (ref 92–100)
ARTERIAL BLD GAS O2 SATURATION: 97 % (ref 92–100)
ARTERIAL BLOOD GAS BASE EXCESS: 1.6
ARTERIAL BLOOD GAS BASE EXCESS: 1.6
ARTERIAL BLOOD GAS HCO3: 25.4 MEQ/L (ref 22–26)
ARTERIAL BLOOD GAS HCO3: 27.5 MEQ/L (ref 22–26)
ARTERIAL BLOOD GAS PCO2: 37 MM HG (ref 35–45)
ARTERIAL BLOOD GAS PCO2: 51 MM HG (ref 35–45)
ARTERIAL BLOOD GAS PH: 7.35 (ref 7.35–7.45)
ARTERIAL BLOOD GAS PH: 7.46 (ref 7.35–7.45)
ARTERIAL BLOOD GAS PO2: 135 MM HG (ref 80–105)
ARTERIAL BLOOD GAS PO2: 227 MM HG (ref 80–105)
BUN BLD-MCNC: 18 MG/DL (ref 8–20)
CALCIUM BLD-MCNC: 7.8 MG/DL (ref 8.3–10.3)
CALCULATED O2 SATURATION: 100 % (ref 92–100)
CALCULATED O2 SATURATION: 99 % (ref 92–100)
CARBOXYHEMOGLOBIN: 1.4 % SAT (ref 0–3)
CARBOXYHEMOGLOBIN: 1.4 % SAT (ref 0–3)
CHLORIDE: 108 MMOL/L (ref 101–111)
CHOLEST SMN-MCNC: 130 MG/DL (ref ?–200)
CO2: 26 MMOL/L (ref 22–32)
CREAT BLD-MCNC: 0.96 MG/DL (ref 0.7–1.3)
ERYTHROCYTE [DISTWIDTH] IN BLOOD BY AUTOMATED COUNT: 13.9 % (ref 11.5–16)
FIBRINOGEN: 163 MG/DL (ref 200–446)
FIBRINOGEN: 163 MG/DL (ref 200–446)
FIBRINOGEN: 194 MG/DL (ref 200–446)
FIO2: 40 %
FIO2: 60 %
GLUCOSE BLD-MCNC: 111 MG/DL (ref 65–99)
GLUCOSE BLD-MCNC: 111 MG/DL (ref 65–99)
GLUCOSE BLD-MCNC: 111 MG/DL (ref 70–99)
GLUCOSE BLD-MCNC: 112 MG/DL (ref 65–99)
GLUCOSE BLD-MCNC: 115 MG/DL (ref 65–99)
GLUCOSE BLD-MCNC: 123 MG/DL (ref 65–99)
GLUCOSE BLD-MCNC: 137 MG/DL (ref 65–99)
GLUCOSE BLD-MCNC: 141 MG/DL (ref 65–99)
GLUCOSE BLD-MCNC: 162 MG/DL (ref 65–99)
GLUCOSE BLD-MCNC: 202 MG/DL (ref 65–99)
GLUCOSE BLD-MCNC: 222 MG/DL (ref 65–99)
HAV IGM SER QL: 2.3 MG/DL (ref 1.7–3)
HCT VFR BLD AUTO: 24.9 % (ref 37–53)
HDLC SERPL-MCNC: 47 MG/DL (ref 45–?)
HDLC SERPL: 2.77 {RATIO} (ref ?–4.97)
HGB BLD-MCNC: 8.4 G/DL (ref 13–17)
INR BLD: 1.06 (ref 0.89–1.11)
INR BLD: 1.11 (ref 0.89–1.11)
INR BLD: 1.82 (ref 0.89–1.11)
ISTAT ACTIVATED CLOTTING TIME: 114 SECONDS (ref 74–137)
ISTAT ACTIVATED CLOTTING TIME: 114 SECONDS (ref 74–137)
ISTAT ACTIVATED CLOTTING TIME: 120 SECONDS (ref 74–137)
ISTAT ACTIVATED CLOTTING TIME: 131 SECONDS (ref 74–137)
ISTAT ACTIVATED CLOTTING TIME: 439 SECONDS (ref 74–137)
ISTAT ACTIVATED CLOTTING TIME: 450 SECONDS (ref 74–137)
ISTAT ACTIVATED CLOTTING TIME: 714 SECONDS (ref 74–137)
ISTAT ACTIVATED CLOTTING TIME: 769 SECONDS (ref 74–137)
ISTAT ACTIVATED CLOTTING TIME: 835 SECONDS (ref 74–137)
ISTAT ACTIVATED CLOTTING TIME: 852 SECONDS (ref 74–137)
ISTAT BLOOD GAS BASE EXCESS: 1 MMOL/L
ISTAT BLOOD GAS BASE EXCESS: 1 MMOL/L
ISTAT BLOOD GAS BASE EXCESS: 2 MMOL/L
ISTAT BLOOD GAS BASE EXCESS: 3 MMOL/L
ISTAT BLOOD GAS BASE EXCESS: 4 MMOL/L
ISTAT BLOOD GAS BASE EXCESS: 4 MMOL/L
ISTAT BLOOD GAS BASE EXCESS: 5 MMOL/L
ISTAT BLOOD GAS BASE EXCESS: 5 MMOL/L
ISTAT BLOOD GAS HCO3: 25.5 MEQ/L (ref 22–26)
ISTAT BLOOD GAS HCO3: 26 MEQ/L (ref 22–26)
ISTAT BLOOD GAS HCO3: 26.4 MEQ/L (ref 22–26)
ISTAT BLOOD GAS HCO3: 26.7 MEQ/L (ref 22–26)
ISTAT BLOOD GAS HCO3: 26.9 MEQ/L (ref 22–26)
ISTAT BLOOD GAS HCO3: 27 MEQ/L (ref 22–26)
ISTAT BLOOD GAS HCO3: 27.1 MEQ/L (ref 22–26)
ISTAT BLOOD GAS HCO3: 27.8 MEQ/L (ref 22–26)
ISTAT BLOOD GAS HCO3: 28.9 MEQ/L (ref 22–26)
ISTAT BLOOD GAS HCO3: 29.2 MEQ/L (ref 22–26)
ISTAT BLOOD GAS HCO3: 29.3 MEQ/L (ref 22–26)
ISTAT BLOOD GAS O2 SATURATION: 100 % (ref 92–100)
ISTAT BLOOD GAS O2 SATURATION: 81 % (ref 92–100)
ISTAT BLOOD GAS O2 SATURATION: 93 % (ref 92–100)
ISTAT BLOOD GAS O2 SATURATION: 97 % (ref 92–100)
ISTAT BLOOD GAS O2 SATURATION: 97 % (ref 92–100)
ISTAT BLOOD GAS O2 SATURATION: 98 % (ref 92–100)
ISTAT BLOOD GAS O2 SATURATION: 99 % (ref 92–100)
ISTAT BLOOD GAS PCO2: 24 MMHG (ref 35–45)
ISTAT BLOOD GAS PCO2: 25 MMHG (ref 35–45)
ISTAT BLOOD GAS PCO2: 30 MMHG (ref 35–45)
ISTAT BLOOD GAS PCO2: 31 MMHG (ref 35–45)
ISTAT BLOOD GAS PCO2: 36 MMHG (ref 35–45)
ISTAT BLOOD GAS PCO2: 37 MMHG (ref 35–45)
ISTAT BLOOD GAS PCO2: 39 MMHG (ref 35–45)
ISTAT BLOOD GAS PCO2: 40 MMHG (ref 35–45)
ISTAT BLOOD GAS PCO2: 41 MMHG (ref 35–45)
ISTAT BLOOD GAS PCO2: 43 MMHG (ref 35–45)
ISTAT BLOOD GAS PCO2: 45 MMHG (ref 35–45)
ISTAT BLOOD GAS PH: 7.39 (ref 7.35–7.45)
ISTAT BLOOD GAS PH: 7.4 (ref 7.35–7.45)
ISTAT BLOOD GAS PH: 7.41 (ref 7.35–7.45)
ISTAT BLOOD GAS PH: 7.44 (ref 7.35–7.45)
ISTAT BLOOD GAS PH: 7.44 (ref 7.35–7.45)
ISTAT BLOOD GAS PH: 7.45 (ref 7.35–7.45)
ISTAT BLOOD GAS PH: 7.47 (ref 7.35–7.45)
ISTAT BLOOD GAS PH: 7.54 (ref 7.35–7.45)
ISTAT BLOOD GAS PH: 7.57 (ref 7.35–7.45)
ISTAT BLOOD GAS PH: 7.58 (ref 7.35–7.45)
ISTAT BLOOD GAS PH: 7.64 (ref 7.35–7.45)
ISTAT BLOOD GAS PO2: 149 MMHG (ref 80–105)
ISTAT BLOOD GAS PO2: 168 MMHG (ref 80–105)
ISTAT BLOOD GAS PO2: 196 MMHG (ref 80–105)
ISTAT BLOOD GAS PO2: 203 MMHG (ref 80–105)
ISTAT BLOOD GAS PO2: 217 MMHG (ref 80–105)
ISTAT BLOOD GAS PO2: 224 MMHG (ref 80–105)
ISTAT BLOOD GAS PO2: 37 MMHG (ref 80–105)
ISTAT BLOOD GAS PO2: 37 MMHG (ref 80–105)
ISTAT BLOOD GAS PO2: 38 MMHG (ref 80–105)
ISTAT BLOOD GAS PO2: 42 MMHG (ref 80–105)
ISTAT BLOOD GAS PO2: 70 MMHG (ref 80–105)
ISTAT BLOOD GAS TCO2: 27 MMOL/L (ref 22–32)
ISTAT BLOOD GAS TCO2: 28 MMOL/L (ref 22–32)
ISTAT BLOOD GAS TCO2: 29 MMOL/L (ref 22–32)
ISTAT BLOOD GAS TCO2: 30 MMOL/L (ref 22–32)
ISTAT BLOOD GAS TCO2: 30 MMOL/L (ref 22–32)
ISTAT BLOOD GAS TCO2: 31 MMOL/L (ref 22–32)
ISTAT HEMATOCRIT: 25 % (ref 37–54)
ISTAT HEMATOCRIT: 25 % (ref 37–54)
ISTAT HEMATOCRIT: 26 % (ref 37–54)
ISTAT HEMATOCRIT: 26 % (ref 37–54)
ISTAT HEMATOCRIT: 27 % (ref 37–54)
ISTAT HEMATOCRIT: 30 % (ref 37–54)
ISTAT HEMATOCRIT: 31 % (ref 37–54)
ISTAT HEMATOCRIT: 33 % (ref 37–54)
ISTAT HEMATOCRIT: 35 % (ref 37–54)
ISTAT IONIZED CALCIUM: 0.98 MMOL/L (ref 1.12–1.32)
ISTAT IONIZED CALCIUM: 1.01 MMOL/L (ref 1.12–1.32)
ISTAT IONIZED CALCIUM: 1.02 MMOL/L (ref 1.12–1.32)
ISTAT IONIZED CALCIUM: 1.04 MMOL/L (ref 1.12–1.32)
ISTAT IONIZED CALCIUM: 1.04 MMOL/L (ref 1.12–1.32)
ISTAT IONIZED CALCIUM: 1.09 MMOL/L (ref 1.12–1.32)
ISTAT IONIZED CALCIUM: 1.1 MMOL/L (ref 1.12–1.32)
ISTAT IONIZED CALCIUM: 1.11 MMOL/L (ref 1.12–1.32)
ISTAT IONIZED CALCIUM: 1.11 MMOL/L (ref 1.12–1.32)
ISTAT IONIZED CALCIUM: 1.12 MMOL/L (ref 1.12–1.32)
ISTAT IONIZED CALCIUM: 1.31 MMOL/L (ref 1.12–1.32)
ISTAT PATIENT TEMPERATURE: 24 DEGREE
ISTAT PATIENT TEMPERATURE: 29 DEGREE
ISTAT PATIENT TEMPERATURE: 36 DEGREE
ISTAT POTASSIUM: 3.1 MMOL/L (ref 3.6–5.1)
ISTAT POTASSIUM: 3.3 MMOL/L (ref 3.6–5.1)
ISTAT POTASSIUM: 3.3 MMOL/L (ref 3.6–5.1)
ISTAT POTASSIUM: 3.4 MMOL/L (ref 3.6–5.1)
ISTAT POTASSIUM: 3.4 MMOL/L (ref 3.6–5.1)
ISTAT POTASSIUM: 3.7 MMOL/L (ref 3.6–5.1)
ISTAT POTASSIUM: 3.9 MMOL/L (ref 3.6–5.1)
ISTAT POTASSIUM: 4 MMOL/L (ref 3.6–5.1)
ISTAT POTASSIUM: 4.1 MMOL/L (ref 3.6–5.1)
ISTAT POTASSIUM: 4.9 MMOL/L (ref 3.6–5.1)
ISTAT POTASSIUM: 5.7 MMOL/L (ref 3.6–5.1)
ISTAT SODIUM: 136 MMOL/L (ref 136–144)
ISTAT SODIUM: 137 MMOL/L (ref 136–144)
ISTAT SODIUM: 139 MMOL/L (ref 136–144)
ISTAT SODIUM: 140 MMOL/L (ref 136–144)
ISTAT SODIUM: 142 MMOL/L (ref 136–144)
ISTAT SODIUM: 143 MMOL/L (ref 136–144)
ISTAT SODIUM: 144 MMOL/L (ref 136–144)
ISTAT SODIUM: 145 MMOL/L (ref 136–144)
LDLC SERPL CALC-MCNC: 69 MG/DL (ref ?–130)
LDLC SERPL-MCNC: 14 MG/DL (ref 5–40)
MCH RBC QN AUTO: 33.1 PG (ref 27–33.2)
MCHC RBC AUTO-ENTMCNC: 33.7 G/DL (ref 31–37)
MCV RBC AUTO: 98 FL (ref 80–99)
METHEMOGLOBIN: 1.4 % SAT (ref 0.4–1.5)
METHEMOGLOBIN: 1.4 % SAT (ref 0.4–1.5)
NITRIC OXIDE: 40 PPM
NONHDLC SERPL-MCNC: 83 MG/DL (ref ?–130)
PATIENT TEMPERATURE: 99.2 F
PATIENT TEMPERATURE: 99.7 F
PEEP: 5 CM H2O
PEEP: 5 CM H2O
PLATELET # BLD AUTO: 116 10(3)UL (ref 150–450)
PLATELET # BLD AUTO: 130 10(3)UL (ref 150–450)
PLATELET # BLD AUTO: 153 10(3)UL (ref 150–450)
POTASSIUM SERPL-SCNC: 3.2 MMOL/L (ref 3.6–5.1)
PSA SERPL DL<=0.01 NG/ML-MCNC: 13.8 SECONDS (ref 12–14.3)
PSA SERPL DL<=0.01 NG/ML-MCNC: 14.4 SECONDS (ref 12–14.3)
PSA SERPL DL<=0.01 NG/ML-MCNC: 21.3 SECONDS (ref 12–14.3)
RBC # BLD AUTO: 2.54 X10(6)UL (ref 3.8–5.8)
RED CELL DISTRIBUTION WIDTH-SD: 49.2 FL (ref 35.1–46.3)
SODIUM SERPL-SCNC: 145 MMOL/L (ref 136–144)
TIDAL VOLUME: 400 ML
TIDAL VOLUME: 500 ML
TOTAL HEMOGLOBIN: 9 G/DL (ref 12.6–17.4)
TOTAL HEMOGLOBIN: 9.1 G/DL (ref 12.6–17.4)
TRIGLYCERIDES: 69 MG/DL (ref ?–150)
VENT RATE: 14 /MIN
VENT RATE: 18 /MIN
WBC # BLD AUTO: 12.5 X10(3) UL (ref 4–13)

## 2017-08-17 PROCEDURE — 99222 1ST HOSP IP/OBS MODERATE 55: CPT | Performed by: HOSPITALIST

## 2017-08-17 PROCEDURE — 5A1221Z PERFORMANCE OF CARDIAC OUTPUT, CONTINUOUS: ICD-10-PCS | Performed by: THORACIC SURGERY (CARDIOTHORACIC VASCULAR SURGERY)

## 2017-08-17 PROCEDURE — 02RX0JZ REPLACEMENT OF THORACIC AORTA, ASCENDING/ARCH WITH SYNTHETIC SUBSTITUTE, OPEN APPROACH: ICD-10-PCS | Performed by: THORACIC SURGERY (CARDIOTHORACIC VASCULAR SURGERY)

## 2017-08-17 PROCEDURE — 30233M1 TRANSFUSION OF NONAUTOLOGOUS PLASMA CRYOPRECIPITATE INTO PERIPHERAL VEIN, PERCUTANEOUS APPROACH: ICD-10-PCS | Performed by: THORACIC SURGERY (CARDIOTHORACIC VASCULAR SURGERY)

## 2017-08-17 PROCEDURE — 02RF08Z REPLACEMENT OF AORTIC VALVE WITH ZOOPLASTIC TISSUE, OPEN APPROACH: ICD-10-PCS | Performed by: THORACIC SURGERY (CARDIOTHORACIC VASCULAR SURGERY)

## 2017-08-17 PROCEDURE — 30233K1 TRANSFUSION OF NONAUTOLOGOUS FROZEN PLASMA INTO PERIPHERAL VEIN, PERCUTANEOUS APPROACH: ICD-10-PCS | Performed by: THORACIC SURGERY (CARDIOTHORACIC VASCULAR SURGERY)

## 2017-08-17 PROCEDURE — 30233R1 TRANSFUSION OF NONAUTOLOGOUS PLATELETS INTO PERIPHERAL VEIN, PERCUTANEOUS APPROACH: ICD-10-PCS | Performed by: THORACIC SURGERY (CARDIOTHORACIC VASCULAR SURGERY)

## 2017-08-17 PROCEDURE — 71010 XR CHEST AP PORTABLE  (CPT=71010): CPT | Performed by: THORACIC SURGERY (CARDIOTHORACIC VASCULAR SURGERY)

## 2017-08-17 DEVICE — BARD® PTFE FELT, 15.2 CM X 15.2 CM
Type: IMPLANTABLE DEVICE | Site: AORTA | Status: FUNCTIONAL
Brand: BARD® PTFE FELT

## 2017-08-17 DEVICE — GRAFT HEMASHIELD 32X30: Type: IMPLANTABLE DEVICE | Site: AORTA | Status: FUNCTIONAL

## 2017-08-17 DEVICE — VALVE AORTIC MAGNA EASE 25MM: Type: IMPLANTABLE DEVICE | Site: AORTA | Status: FUNCTIONAL

## 2017-08-17 RX ORDER — CARVEDILOL 3.12 MG/1
3.12 TABLET ORAL 2 TIMES DAILY WITH MEALS
Status: DISCONTINUED | OUTPATIENT
Start: 2017-08-17 | End: 2017-08-21

## 2017-08-17 RX ORDER — ASPIRIN 325 MG
325 TABLET ORAL ONCE
Status: COMPLETED | OUTPATIENT
Start: 2017-08-17 | End: 2017-08-18

## 2017-08-17 RX ORDER — MORPHINE SULFATE 4 MG/ML
8 INJECTION, SOLUTION INTRAMUSCULAR; INTRAVENOUS
Status: DISCONTINUED | OUTPATIENT
Start: 2017-08-17 | End: 2017-08-19

## 2017-08-17 RX ORDER — ASPIRIN 300 MG
300 SUPPOSITORY, RECTAL RECTAL ONCE
Status: COMPLETED | OUTPATIENT
Start: 2017-08-17 | End: 2017-08-18

## 2017-08-17 RX ORDER — ALFUZOSIN HYDROCHLORIDE 10 MG/1
10 TABLET, EXTENDED RELEASE ORAL
Status: DISCONTINUED | OUTPATIENT
Start: 2017-08-18 | End: 2017-08-24

## 2017-08-17 RX ORDER — DEXMEDETOMIDINE HYDROCHLORIDE 4 UG/ML
INJECTION, SOLUTION INTRAVENOUS CONTINUOUS
Status: DISCONTINUED | OUTPATIENT
Start: 2017-08-17 | End: 2017-08-18

## 2017-08-17 RX ORDER — LEVOFLOXACIN 5 MG/ML
INJECTION, SOLUTION INTRAVENOUS
Status: DISCONTINUED | OUTPATIENT
Start: 2017-08-17 | End: 2017-08-18 | Stop reason: ALTCHOICE

## 2017-08-17 RX ORDER — POTASSIUM CHLORIDE 14.9 MG/ML
20 INJECTION INTRAVENOUS AS NEEDED
Status: DISCONTINUED | OUTPATIENT
Start: 2017-08-17 | End: 2017-08-24

## 2017-08-17 RX ORDER — TEMAZEPAM 15 MG/1
15 CAPSULE ORAL NIGHTLY PRN
Status: DISCONTINUED | OUTPATIENT
Start: 2017-08-17 | End: 2017-08-21

## 2017-08-17 RX ORDER — HYDROCODONE BITARTRATE AND ACETAMINOPHEN 10; 325 MG/1; MG/1
1 TABLET ORAL EVERY 4 HOURS PRN
Status: DISCONTINUED | OUTPATIENT
Start: 2017-08-17 | End: 2017-08-22

## 2017-08-17 RX ORDER — POTASSIUM CHLORIDE 29.8 MG/ML
40 INJECTION INTRAVENOUS AS NEEDED
Status: DISCONTINUED | OUTPATIENT
Start: 2017-08-17 | End: 2017-08-24

## 2017-08-17 RX ORDER — MORPHINE SULFATE 4 MG/ML
4 INJECTION, SOLUTION INTRAMUSCULAR; INTRAVENOUS
Status: DISCONTINUED | OUTPATIENT
Start: 2017-08-17 | End: 2017-08-19

## 2017-08-17 RX ORDER — MORPHINE SULFATE 4 MG/ML
2 INJECTION, SOLUTION INTRAMUSCULAR; INTRAVENOUS
Status: DISCONTINUED | OUTPATIENT
Start: 2017-08-17 | End: 2017-08-19

## 2017-08-17 RX ORDER — FAMOTIDINE 10 MG/ML
20 INJECTION, SOLUTION INTRAVENOUS 2 TIMES DAILY
Status: DISCONTINUED | OUTPATIENT
Start: 2017-08-17 | End: 2017-08-21

## 2017-08-17 RX ORDER — BISACODYL 10 MG
10 SUPPOSITORY, RECTAL RECTAL
Status: DISCONTINUED | OUTPATIENT
Start: 2017-08-17 | End: 2017-08-24

## 2017-08-17 RX ORDER — LEVOFLOXACIN 5 MG/ML
500 INJECTION, SOLUTION INTRAVENOUS ONCE
Status: DISCONTINUED | OUTPATIENT
Start: 2017-08-18 | End: 2017-08-18

## 2017-08-17 RX ORDER — DEXTROSE MONOHYDRATE 25 G/50ML
50 INJECTION, SOLUTION INTRAVENOUS
Status: DISCONTINUED | OUTPATIENT
Start: 2017-08-17 | End: 2017-08-21

## 2017-08-17 RX ORDER — DOCUSATE SODIUM 100 MG/1
100 CAPSULE, LIQUID FILLED ORAL 2 TIMES DAILY
Status: DISCONTINUED | OUTPATIENT
Start: 2017-08-17 | End: 2017-08-24

## 2017-08-17 RX ORDER — FAMOTIDINE 20 MG/1
20 TABLET ORAL 2 TIMES DAILY
Status: DISCONTINUED | OUTPATIENT
Start: 2017-08-17 | End: 2017-08-24

## 2017-08-17 RX ORDER — DEXTROSE AND SODIUM CHLORIDE 5; .45 G/100ML; G/100ML
INJECTION, SOLUTION INTRAVENOUS CONTINUOUS
Status: ACTIVE | OUTPATIENT
Start: 2017-08-18 | End: 2017-08-18

## 2017-08-17 RX ORDER — SODIUM CHLORIDE 9 MG/ML
INJECTION, SOLUTION INTRAVENOUS CONTINUOUS
Status: DISCONTINUED | OUTPATIENT
Start: 2017-08-17 | End: 2017-08-21

## 2017-08-17 RX ORDER — ALBUMIN, HUMAN INJ 5% 5 %
250 SOLUTION INTRAVENOUS ONCE AS NEEDED
Status: DISCONTINUED | OUTPATIENT
Start: 2017-08-17 | End: 2017-08-24

## 2017-08-17 RX ORDER — BACITRACIN 50000 [USP'U]/1
INJECTION, POWDER, LYOPHILIZED, FOR SOLUTION INTRAMUSCULAR AS NEEDED
Status: DISCONTINUED | OUTPATIENT
Start: 2017-08-17 | End: 2017-08-17 | Stop reason: HOSPADM

## 2017-08-17 RX ORDER — POLYETHYLENE GLYCOL 3350 17 G/17G
1 POWDER, FOR SOLUTION ORAL DAILY PRN
Status: DISCONTINUED | OUTPATIENT
Start: 2017-08-17 | End: 2017-08-24

## 2017-08-17 RX ORDER — DOBUTAMINE HYDROCHLORIDE 200 MG/100ML
INJECTION INTRAVENOUS CONTINUOUS PRN
Status: DISCONTINUED | OUTPATIENT
Start: 2017-08-17 | End: 2017-08-21 | Stop reason: ALTCHOICE

## 2017-08-17 RX ORDER — MIDAZOLAM HYDROCHLORIDE 1 MG/ML
1 INJECTION INTRAMUSCULAR; INTRAVENOUS EVERY 30 MIN PRN
Status: DISCONTINUED | OUTPATIENT
Start: 2017-08-17 | End: 2017-08-24

## 2017-08-17 RX ORDER — ONDANSETRON 2 MG/ML
4 INJECTION INTRAMUSCULAR; INTRAVENOUS EVERY 6 HOURS PRN
Status: DISCONTINUED | OUTPATIENT
Start: 2017-08-17 | End: 2017-08-24

## 2017-08-17 RX ORDER — CHLORHEXIDINE GLUCONATE 0.12 MG/ML
15 RINSE ORAL
Status: DISCONTINUED | OUTPATIENT
Start: 2017-08-17 | End: 2017-08-18

## 2017-08-17 RX ORDER — NITROGLYCERIN 20 MG/100ML
INJECTION INTRAVENOUS CONTINUOUS PRN
Status: DISCONTINUED | OUTPATIENT
Start: 2017-08-17 | End: 2017-08-24

## 2017-08-17 RX ORDER — ASPIRIN 325 MG
325 TABLET ORAL DAILY
Status: DISCONTINUED | OUTPATIENT
Start: 2017-08-17 | End: 2017-08-24

## 2017-08-17 RX ORDER — IPRATROPIUM BROMIDE AND ALBUTEROL SULFATE 2.5; .5 MG/3ML; MG/3ML
3 SOLUTION RESPIRATORY (INHALATION) EVERY 4 HOURS PRN
Status: DISCONTINUED | OUTPATIENT
Start: 2017-08-17 | End: 2017-08-24

## 2017-08-17 RX ORDER — DEXTROSE AND SODIUM CHLORIDE 5; .45 G/100ML; G/100ML
INJECTION, SOLUTION INTRAVENOUS CONTINUOUS
Status: ACTIVE | OUTPATIENT
Start: 2017-08-17 | End: 2017-08-18

## 2017-08-17 RX ORDER — ALBUMIN, HUMAN INJ 5% 5 %
SOLUTION INTRAVENOUS
Status: COMPLETED
Start: 2017-08-17 | End: 2017-08-17

## 2017-08-17 RX ORDER — HYDROCODONE BITARTRATE AND ACETAMINOPHEN 10; 325 MG/1; MG/1
2 TABLET ORAL EVERY 4 HOURS PRN
Status: DISCONTINUED | OUTPATIENT
Start: 2017-08-17 | End: 2017-08-22

## 2017-08-17 NOTE — INTERVAL H&P NOTE
Pre-op Diagnosis: aortic stenosis, ascending aortic aneurysm    The above referenced H&P was reviewed by Beatriz Jones MD on 8/17/2017, the patient was examined and no significant changes have occurred in the patient's condition since the H&P was performed.

## 2017-08-17 NOTE — H&P (VIEW-ONLY)
[facility]  [address]  [phone]            2017    TO: Marlin Salvador M.D.    [facility] Flood:    Katt Reina  : 1940    I had the opportunity to see your patient, Mr. Jeff Kc On review of systems, the patient has no complaints of fatigue, blurred vision, or hearing problems. The patient denies palpitations, wheezing, syncope, or dizziness. There are no symptoms of GI reflux, urgency or frequency of urination, or rashes.   The Therefore, I think the correct choice is probably surgery wherein a tissue valve would be placed, as well as replacement of the ascending aorta. Operative risks, I am going to guess, is somewhere in the 3-5% range for the patient.   I have discussed the ST

## 2017-08-17 NOTE — HISTORICAL OFFICE NOTE
Jack See  : 1940  ACCOUNT:  [id number]  [phone]  PCP: Dr. Prachi Reyes    TODAY'S DATE: 2017  DICTATED BY:  Blaine Hernandez M.D.]    CHIEF COMPLAINT: [Followup of Aortic stenosis, Followup of Dyspnea, Followup of Hypertension and b Weight -  148, Height -   68 , BMI - 22.5 ]    CONS: in no distress and pleasant. WEIGHT: BMI parameters reviewed and discussed. HEAD/FACE: normocephalic. ENT: mucosa pink and moist. NECK: jugular venous pressure not elevated. RESP: clear to auscultation. Lita Altamirano M.D.    TD/rt - DD: 08/09/2017 - DT: 08/11/2017 - Job ID: [id number]   C: Dr. Eric Sterling

## 2017-08-17 NOTE — ANESTHESIA PREPROCEDURE EVALUATION
PRE-OP EVALUATION    Patient Name: Cassius Swartz    Pre-op Diagnosis: aortic stenosis, ascending aortic aneurysm    Procedure(s):  aortic valve replacement using a 25 mm magna ease, ascending aortic replacement,intraoperative transesophageal echocardiogram HYDROmorphone HCl (DILAUDID) 8 MG Oral Tab Take 8 mg by mouth every 4 (four) hours as needed. Disp:  Rfl:    Zolpidem Tartrate (AMBIEN) 10 MG Oral Tab Take 10 mg by mouth nightly as needed.  Disp:  Rfl:    Tamsulosin HCl (FLOMAX) 0.4 MG Oral Cap Take 0.8 exam in one year               to be sure unchanged     Smoking status: Never Smoker    Smokeless tobacco: Never Used    Alcohol use No       Drug use: No     Available pre-op labs reviewed.     Lab Results  Component Value Date   WBC 6.9 08/12/2017   RBC 4

## 2017-08-17 NOTE — CONSULTS
[address] Patient Status:  Inpatient    1940 MRN [id number]   [facility] 6NE-A Attending Joe Lau MD   Hosp Day # 0 PCP Elidia Kim MD     Reason for consult: Medical Management    Requ pancreatic duct and bile duct                without evidence of a mass lesion.   No date: HERNIA SURGERY  No date: NECK SPINE FUSE&REMOVE ADDL      Comment: cervical  No date: OTHER SURGICAL HISTORY      Comment: carpal tunnel  No date: SPINE SURGERY PROCE every 4 (four) hours as needed. Disp:  Rfl:    Zolpidem Tartrate (AMBIEN) 10 MG Oral Tab Take 10 mg by mouth nightly as needed. Disp:  Rfl:    Tamsulosin HCl (FLOMAX) 0.4 MG Oral Cap Take 0.8 mg by mouth nightly.    Disp:  Rfl:        Review of Systems:   A pain  6. Anemia  7. Hypokalemia  8.  Thrombocytopenia: Likely consumptive, follow    Quality:  · DVT Prophylaxis: SCD's  · CODE status: Full Code  · Ribeiro: Present    Plan of care discussed with SHO Allen MD  9/30/8313    **Certification      PHYS

## 2017-08-17 NOTE — ANESTHESIA POSTPROCEDURE EVALUATION
[address] [facility] Patient Status:  Inpatient   Age/Gender 77year old male MRN [id number]   [facility] 6NE-A Attending Bernice Taylor MD   Hosp Day # 0 PCP Zuleyma Powers MD       Anesthesia Post-op Note    Procedure(s):  aor

## 2017-08-17 NOTE — PROGRESS NOTES
Anesth Resp Care  77 yr  old male s/p AVR/ Ascending aneurysm repair  /60, HR 95, 100 % sat  PRVC 14, , FIO2 60%, PEEP+5  Lungs: BBS =    7.5 OETT at 21 cm  HT: S1S2   at 5, EPI at 5  A/P  1) Wean FIO2  2) Wean NO- extubate tomorrow  Racquel Ruano

## 2017-08-18 ENCOUNTER — APPOINTMENT (OUTPATIENT)
Dept: GENERAL RADIOLOGY | Facility: HOSPITAL | Age: 77
DRG: 220 | End: 2017-08-18
Attending: THORACIC SURGERY (CARDIOTHORACIC VASCULAR SURGERY)
Payer: MEDICARE

## 2017-08-18 PROBLEM — F41.1 ANXIETY STATE, UNSPECIFIED: Chronic | Status: ACTIVE | Noted: 2017-08-18

## 2017-08-18 PROBLEM — N40.0 BPH (BENIGN PROSTATIC HYPERPLASIA): Status: ACTIVE | Noted: 2017-08-18

## 2017-08-18 PROBLEM — F41.1 ANXIETY STATE: Chronic | Status: ACTIVE | Noted: 2017-08-18

## 2017-08-18 LAB
ARTERIAL BLD GAS O2 SATURATION: 96 % (ref 92–100)
ARTERIAL BLD GAS O2 SATURATION: 96 % (ref 92–100)
ARTERIAL BLOOD GAS BASE EXCESS: 0.1
ARTERIAL BLOOD GAS BASE EXCESS: 0.5
ARTERIAL BLOOD GAS HCO3: 25.2 MEQ/L (ref 22–26)
ARTERIAL BLOOD GAS HCO3: 25.8 MEQ/L (ref 22–26)
ARTERIAL BLOOD GAS PCO2: 42 MM HG (ref 35–45)
ARTERIAL BLOOD GAS PCO2: 45 MM HG (ref 35–45)
ARTERIAL BLOOD GAS PH: 7.38 (ref 7.35–7.45)
ARTERIAL BLOOD GAS PH: 7.39 (ref 7.35–7.45)
ARTERIAL BLOOD GAS PO2: 100 MM HG (ref 80–105)
ARTERIAL BLOOD GAS PO2: 104 MM HG (ref 80–105)
ATRIAL RATE: 88 BPM
ATRIAL RATE: 95 BPM
ATRIAL RATE: 96 BPM
BASOPHILS # BLD AUTO: 0.01 X10(3) UL (ref 0–0.1)
BASOPHILS NFR BLD AUTO: 0.1 %
BLOOD TYPE BARCODE: 5100
BLOOD TYPE BARCODE: 6200
BUN BLD-MCNC: 19 MG/DL (ref 8–20)
BUN BLD-MCNC: 20 MG/DL (ref 8–20)
CALCIUM BLD-MCNC: 8.2 MG/DL (ref 8.3–10.3)
CALCIUM BLD-MCNC: 8.2 MG/DL (ref 8.3–10.3)
CALCULATED O2 SATURATION: 98 % (ref 92–100)
CALCULATED O2 SATURATION: 98 % (ref 92–100)
CARBOXYHEMOGLOBIN: 1.3 % SAT (ref 0–3)
CARBOXYHEMOGLOBIN: 1.3 % SAT (ref 0–3)
CHLORIDE: 111 MMOL/L (ref 101–111)
CHLORIDE: 112 MMOL/L (ref 101–111)
CO2: 26 MMOL/L (ref 22–32)
CO2: 27 MMOL/L (ref 22–32)
CREAT BLD-MCNC: 0.84 MG/DL (ref 0.7–1.3)
CREAT BLD-MCNC: 0.88 MG/DL (ref 0.7–1.3)
EOSINOPHIL # BLD AUTO: 0 X10(3) UL (ref 0–0.3)
EOSINOPHIL NFR BLD AUTO: 0 %
ERYTHROCYTE [DISTWIDTH] IN BLOOD BY AUTOMATED COUNT: 13.9 % (ref 11.5–16)
FIO2: 40 %
GLUCOSE BLD-MCNC: 125 MG/DL (ref 70–99)
GLUCOSE BLD-MCNC: 126 MG/DL (ref 65–99)
GLUCOSE BLD-MCNC: 93 MG/DL (ref 70–99)
HAV IGM SER QL: 2.3 MG/DL (ref 1.7–3)
HAV IGM SER QL: 2.4 MG/DL (ref 1.7–3)
HCT VFR BLD AUTO: 25 % (ref 37–53)
HGB BLD-MCNC: 8.6 G/DL (ref 13–17)
IMMATURE GRANULOCYTE COUNT: 0.05 X10(3) UL (ref 0–1)
IMMATURE GRANULOCYTE RATIO %: 0.3 %
IONIZED CALCIUM: 1.18 MMOL/L (ref 1.12–1.32)
L/M: 10 L/MIN
L/M: 40 L/MIN
LACTIC ACID ARTERIAL: 1.4 MMOL/L (ref 0.5–2)
LYMPHOCYTES # BLD AUTO: 0.45 X10(3) UL (ref 0.9–4)
LYMPHOCYTES NFR BLD AUTO: 3.1 %
MCH RBC QN AUTO: 33.7 PG (ref 27–33.2)
MCHC RBC AUTO-ENTMCNC: 34.4 G/DL (ref 31–37)
MCV RBC AUTO: 98 FL (ref 80–99)
METHEMOGLOBIN: 0.7 % SAT (ref 0.4–1.5)
METHEMOGLOBIN: 0.8 % SAT (ref 0.4–1.5)
MONOCYTES # BLD AUTO: 1.49 X10(3) UL (ref 0.1–0.6)
MONOCYTES NFR BLD AUTO: 10.1 %
NEUTROPHIL ABS PRELIM: 12.71 X10 (3) UL (ref 1.3–6.7)
NEUTROPHILS # BLD AUTO: 12.71 X10(3) UL (ref 1.3–6.7)
NEUTROPHILS NFR BLD AUTO: 86.4 %
P AXIS: 27 DEGREES
P AXIS: 42 DEGREES
P AXIS: 53 DEGREES
P-R INTERVAL: 144 MS
P-R INTERVAL: 154 MS
P-R INTERVAL: 176 MS
PATIENT TEMPERATURE: 97.5 F
PATIENT TEMPERATURE: 98.6 F
PEEP: 5 CM H2O
PLATELET # BLD AUTO: 101 10(3)UL (ref 150–450)
POTASSIUM BLOOD GAS: 4 MMOL/L (ref 3.6–5.1)
POTASSIUM SERPL-SCNC: 3.9 MMOL/L (ref 3.6–5.1)
POTASSIUM SERPL-SCNC: 4.3 MMOL/L (ref 3.6–5.1)
PRESSURE SUPPORT: 5 CM H2O
Q-T INTERVAL: 426 MS
Q-T INTERVAL: 442 MS
Q-T INTERVAL: 514 MS
QRS DURATION: 136 MS
QRS DURATION: 146 MS
QRS DURATION: 154 MS
QTC CALCULATION (BEZET): 534 MS
QTC CALCULATION (BEZET): 538 MS
QTC CALCULATION (BEZET): 645 MS
R AXIS: -69 DEGREES
R AXIS: -70 DEGREES
R AXIS: -70 DEGREES
RBC # BLD AUTO: 2.55 X10(6)UL (ref 3.8–5.8)
RED CELL DISTRIBUTION WIDTH-SD: 49.7 FL (ref 35.1–46.3)
SODIUM BLOOD GAS: 141 MMOL/L (ref 136–144)
SODIUM SERPL-SCNC: 145 MMOL/L (ref 136–144)
SODIUM SERPL-SCNC: 146 MMOL/L (ref 136–144)
T AXIS: 108 DEGREES
T AXIS: 108 DEGREES
T AXIS: 114 DEGREES
TOTAL HEMOGLOBIN: 8.4 G/DL (ref 12.6–17.4)
TOTAL HEMOGLOBIN: 9 G/DL (ref 12.6–17.4)
VENTRICULAR RATE: 88 BPM
VENTRICULAR RATE: 95 BPM
VENTRICULAR RATE: 96 BPM
WBC # BLD AUTO: 14.7 X10(3) UL (ref 4–13)

## 2017-08-18 PROCEDURE — 71010 XR CHEST AP PORTABLE  (CPT=71010): CPT | Performed by: THORACIC SURGERY (CARDIOTHORACIC VASCULAR SURGERY)

## 2017-08-18 PROCEDURE — 99232 SBSQ HOSP IP/OBS MODERATE 35: CPT | Performed by: INTERNAL MEDICINE

## 2017-08-18 RX ORDER — DEXTROSE MONOHYDRATE 25 G/50ML
50 INJECTION, SOLUTION INTRAVENOUS
Status: DISCONTINUED | OUTPATIENT
Start: 2017-08-18 | End: 2017-08-24

## 2017-08-18 RX ORDER — FUROSEMIDE 10 MG/ML
20 INJECTION INTRAMUSCULAR; INTRAVENOUS ONCE
Status: COMPLETED | OUTPATIENT
Start: 2017-08-18 | End: 2017-08-18

## 2017-08-18 NOTE — PLAN OF CARE
Assumed care of Amaury @ approximately 9201: vented and sedated with Propofol and Precedex infusions; Left radial and right femoral art lines in place as well as right venous sheath.  NSR on the monitor with occasional PVCs; dobutamine and epinephrine gtts

## 2017-08-18 NOTE — CONSULTS
[facility]  Endocrinology Consultation    Ok Erick Obdulio Patient Status:  Inpatient    1940 MRN [id number]   [facility] 6NE-A Attending Yogesh Singleton MD   Hosp Day # 1 PCP Prosper Marshall MD     Reason for Consultation:  Post-op TONSILLECTOMY  7/20/16: UPPER GI ENDOSCOPY,DIAGNOSIS      Comment: antral erosions (bx negative for H. 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 t Nitroprusside Sodium (NIPRIDE) 50 mg in dextrose 5 % 250 mL infusion, 0.1-4 mcg/kg/min, Intravenous, Continuous PRN  •  aspirin 300 MG rectal suppository 300 mg, 300 mg, Rectal, Once **OR** aspirin tab 325 mg, 325 mg, Per NG Tube, Once  •  docusate sodium (NOVOLIN R) 100 Units in sodium chloride 0.9 % 100 mL infusion, 1-57 Units/hr, Intravenous, Continuous  •  glucose (DEX4) oral liquid 15 g, 15 g, Oral, Q15 Min PRN **OR** Glucose-Vitamin C (DEX-4) 4-0.006 g chewable tab 4 tablet, 4 tablet, Oral, Q15 Min TN <5.7 % 5.5       Lab Results  Component Value Date   WBC 14.7 08/18/2017   HGB 8.6 08/18/2017   HCT 25.0 08/18/2017   .0 08/18/2017   CREATSERUM 0.84 08/18/2017   BUN 20 08/18/2017    08/18/2017   K 4.3 08/18/2017    08/18/2017   CO2 26.

## 2017-08-18 NOTE — PROGRESS NOTES
[facility]  Progress Note    Nurys Conroy Patient Status:  Inpatient    1940 MRN [id number]   [facility] 6NE-A Attending Vivien Garay MD   Hosp Day # 1 PCP Pa Brandt MD     CC: Medical management    SUBJECTIVE: Complain 08/18/2017   CREATSERUM 0.84 08/18/2017   BUN 20 08/18/2017    08/18/2017   K 4.3 08/18/2017    08/18/2017   CO2 26.0 08/18/2017    08/18/2017   CA 8.2 08/18/2017   PTT 36.0 08/17/2017   INR 1.11 08/17/2017   PTP 14.4 08/17/2017   MG 2.3 Continuous PRN   norepinephrine (LEVOPHED) 4 mg/250 ml premix infusion 0.5-30 mcg/min Intravenous Continuous PRN   Nitroprusside Sodium (NIPRIDE) 50 mg in dextrose 5 % 250 mL infusion 0.1-4 mcg/kg/min Intravenous Continuous PRN   docusate sodium (COLACE) c tablet Oral Q15 Min PRN       ASSESSMENT / PLAN:     1. Bicuspid aortic valve status post aortic valve replacement by CV surgery Dr. Linda Segovia: Managed by CV surgery and cardiology. 2. Chronic systolic heart failure-cardiology following  3.  History of Hypertens

## 2017-08-18 NOTE — DIETARY NOTE
Nutrition Short Note    Dietitian consult received per cardiac rehab/CHF protocol. Pt to be educated by cardiac rehab staff and encouraged to attend outpatient classes taught by RD. RD available PRN.     Allie Wheatley MS, RD, LDN

## 2017-08-18 NOTE — CM/SW NOTE
Received order for [facility] post surgery. Met with pt and wife at bedside. Pt is independent. Works out at [employer] a day. Uses not DME. Awaiting pt/ot notes. Both are retired and wife will be home with pt. Anticipate HH at d/c.  Pt and wife in agreement with [facility]

## 2017-08-18 NOTE — PHYSICAL THERAPY NOTE
PHYSICAL THERAPY EVALUATION - INPATIENT     Room Number: 6182/8452-R  Evaluation Date: 8/18/2017  Type of Evaluation: Initial  Physician Order: PT Eval and Treat    Presenting Problem: s/p AVR 8/17/17  Reason for Therapy: Mobility Dysfunction and Disch surgeries  No date: TONSILLECTOMY  7/20/16: UPPER GI ENDOSCOPY,DIAGNOSIS      Comment: antral erosions (bx negative for H. 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 ..  -   Turning over in bed (including adjusting bedclothes, sheets and blankets)?: A Lot   -   Sitting down on and standing up from a chair with arms (e. balance impairments, and decreased knowledge of sternal precautions. These impairments manifest themselves as functional limitations in bed mobility, transfers, ambulation, and stair negotiation.  The patient is below his baseline and would benefit from ski

## 2017-08-18 NOTE — PROGRESS NOTES
08/18/17 1998   Clinical Encounter Type   Visited With Patient   Routine Visit Introduction   Continue Visiting (Encouraged to call  anytime for continuity of care)   Patient's Supportive Strategies/Resources Identified patient's family support

## 2017-08-18 NOTE — OCCUPATIONAL THERAPY NOTE
OCCUPATIONAL THERAPY EVALUATION - INPATIENT     Room Number: 3641/0483-P  Evaluation Date: 8/18/2017  Type of Evaluation: Initial  Presenting Problem: s/p AVR    Physician Order: IP Consult to Occupational Therapy  Reason for Therapy: ADL/IADL Dysfunction spine, x2 L spine surgeries  No date: TONSILLECTOMY  7/20/16: UPPER GI ENDOSCOPY,DIAGNOSIS      Comment: antral erosions (bx negative for H. plyori),                Submucosal lesion in the proximal body of the                stomach on the anterior surfac NEUROLOGICAL FINDINGS  Neurological Findings: Coordination - Finger Opposition        Coordination - Finger Opposition: Symmetrical       ACTIVITY TOLERANCE  O2 Device: Nasal cannula  Liters of O2:  3L    ACTIVITIES OF CHAU cuing. Efficient activity tolerance noted with sitting at EOB, however activity tolerance still requires improvement.  In this OT evaluation patient presents with the following impairments: attention, endurance, fatigue, strength, knowledge of sternal preca

## 2017-08-18 NOTE — PROGRESS NOTES
[facility]  Progress Note    Gene Hayden Patient Status:  Inpatient    1940 MRN [id number]   [facility] 6NE-A Attending Ly Lowry MD   Hosp Day # 1 PCP Harvinder Nolan MD       Assessment:    · AS s/p AVR  · Mild CAD  · C [START ON 8/19/2017] Insulin Aspart Pen  1-25 Units Subcutaneous Once   • carvedilol  3.125 mg Oral BID with meals   • aspirin  325 mg Oral Daily   • Alfuzosin HCl ER  10 mg Oral Daily with breakfast   • docusate sodium  100 mg Oral BID    Or   • docusate

## 2017-08-18 NOTE — PLAN OF CARE
CARDIOVASCULAR - ADULT    • Maintains optimal cardiac output and hemodynamic stability Progressing    • Absence of cardiac arrhythmias or at baseline Progressing        Received patient at 0730 weaned nitric to off.   Placed patient of CPAP and parameters a

## 2017-08-18 NOTE — PROGRESS NOTES
[facility]  CV Surgery Progress Note    Allison Moreno Patient Status:  Inpatient    1940 MRN [id number]   [facility] 6NE-A Attending Melissa Abreu MD   Hosp Day # 1 PCP Vita Gil MD     Subjective:  Pt is intubated/awake. Gino CROW/Salazar  8/18/2017  7:37 AM

## 2017-08-19 LAB
BASOPHILS # BLD AUTO: 0.02 X10(3) UL (ref 0–0.1)
BASOPHILS NFR BLD AUTO: 0.1 %
BUN BLD-MCNC: 22 MG/DL (ref 8–20)
CALCIUM BLD-MCNC: 8.2 MG/DL (ref 8.3–10.3)
CHLORIDE: 105 MMOL/L (ref 101–111)
CO2: 25 MMOL/L (ref 22–32)
CREAT BLD-MCNC: 0.88 MG/DL (ref 0.7–1.3)
EOSINOPHIL # BLD AUTO: 0 X10(3) UL (ref 0–0.3)
EOSINOPHIL NFR BLD AUTO: 0 %
ERYTHROCYTE [DISTWIDTH] IN BLOOD BY AUTOMATED COUNT: 14.3 % (ref 11.5–16)
GLUCOSE BLD-MCNC: 104 MG/DL (ref 65–99)
GLUCOSE BLD-MCNC: 134 MG/DL (ref 65–99)
GLUCOSE BLD-MCNC: 146 MG/DL (ref 65–99)
GLUCOSE BLD-MCNC: 179 MG/DL (ref 65–99)
GLUCOSE BLD-MCNC: 248 MG/DL (ref 65–99)
GLUCOSE BLD-MCNC: 43 MG/DL (ref 65–99)
GLUCOSE BLD-MCNC: 64 MG/DL (ref 70–99)
GLUCOSE BLD-MCNC: 71 MG/DL (ref 65–99)
GLUCOSE BLD-MCNC: 86 MG/DL (ref 65–99)
HAV IGM SER QL: 2.2 MG/DL (ref 1.7–3)
HCT VFR BLD AUTO: 24.7 % (ref 37–53)
HGB BLD-MCNC: 8.5 G/DL (ref 13–17)
IMMATURE GRANULOCYTE COUNT: 0.07 X10(3) UL (ref 0–1)
IMMATURE GRANULOCYTE RATIO %: 0.5 %
LYMPHOCYTES # BLD AUTO: 0.69 X10(3) UL (ref 0.9–4)
LYMPHOCYTES NFR BLD AUTO: 4.5 %
MCH RBC QN AUTO: 34 PG (ref 27–33.2)
MCHC RBC AUTO-ENTMCNC: 34.4 G/DL (ref 31–37)
MCV RBC AUTO: 98.8 FL (ref 80–99)
MONOCYTES # BLD AUTO: 1.82 X10(3) UL (ref 0.1–0.6)
MONOCYTES NFR BLD AUTO: 11.8 %
NEUTROPHIL ABS PRELIM: 12.77 X10 (3) UL (ref 1.3–6.7)
NEUTROPHILS # BLD AUTO: 12.77 X10(3) UL (ref 1.3–6.7)
NEUTROPHILS NFR BLD AUTO: 83.1 %
PLATELET # BLD AUTO: 93 10(3)UL (ref 150–450)
POTASSIUM SERPL-SCNC: 3.9 MMOL/L (ref 3.6–5.1)
RBC # BLD AUTO: 2.5 X10(6)UL (ref 3.8–5.8)
RED CELL DISTRIBUTION WIDTH-SD: 51.7 FL (ref 35.1–46.3)
SODIUM SERPL-SCNC: 139 MMOL/L (ref 136–144)
WBC # BLD AUTO: 15.4 X10(3) UL (ref 4–13)

## 2017-08-19 PROCEDURE — 99232 SBSQ HOSP IP/OBS MODERATE 35: CPT | Performed by: INTERNAL MEDICINE

## 2017-08-19 RX ORDER — FUROSEMIDE 10 MG/ML
20 INJECTION INTRAMUSCULAR; INTRAVENOUS
Status: DISCONTINUED | OUTPATIENT
Start: 2017-08-19 | End: 2017-08-20

## 2017-08-19 RX ORDER — METHYLPREDNISOLONE SODIUM SUCCINATE 125 MG/2ML
500 INJECTION, POWDER, LYOPHILIZED, FOR SOLUTION INTRAMUSCULAR; INTRAVENOUS ONCE
Status: DISCONTINUED | OUTPATIENT
Start: 2017-08-19 | End: 2017-08-19 | Stop reason: SDUPTHER

## 2017-08-19 RX ORDER — HYDROMORPHONE HYDROCHLORIDE 1 MG/ML
1 INJECTION, SOLUTION INTRAMUSCULAR; INTRAVENOUS; SUBCUTANEOUS EVERY 30 MIN PRN
Status: DISCONTINUED | OUTPATIENT
Start: 2017-08-19 | End: 2017-08-22

## 2017-08-19 RX ORDER — AMIODARONE HYDROCHLORIDE 200 MG/1
400 TABLET ORAL 2 TIMES DAILY WITH MEALS
Status: DISCONTINUED | OUTPATIENT
Start: 2017-08-19 | End: 2017-08-24

## 2017-08-19 RX ORDER — DIGOXIN 125 MCG
125 TABLET ORAL DAILY
Status: DISCONTINUED | OUTPATIENT
Start: 2017-08-19 | End: 2017-08-24

## 2017-08-19 NOTE — OPERATIVE REPORT
[address]    PATIENT'S NAME: Robinson PEDROZA   ATTENDING PHYSICIAN: Jael Painter MD   OPERATING PHYSICIAN: Jael Painter MD   PATIENT ACCOUNT#:   [id number]    LOCATION:  [address] [place] [address]  MEDICAL RECORD #:   [id number]       YOB: 1940  ADM was examined and found to be very, very poorly contractile. Really, the appearance of the heart was significantly disturbing, and I was very concerned the patient may die today in the operating room. As such, we telephoned the patient's wife.   She was at lowered in place and tied, and found to fit perfectly. Knots were accomplished with manual knots as well as Cor-Knots.   At this point then, the upper and lower transected ends of the aorta were reinforced with an outside strip of Arslan and interrupted 4- time to make sure there was no significant bleeding. Hemodynamics were stable. He was then transferred to the Intensive Care Unit. As of this dictation at 6:23 p.m., the patient was hemodynamically stable.   He was awakened in the ICU and demonstrate

## 2017-08-19 NOTE — PROGRESS NOTES
[facility]  Endocrinology Progress Note    Cristi Detroit Patient Status:  Inpatient    1940 MRN [id number]   [facility] 6NE-A Attending Bita Zacarias MD   Hosp Day # 2 PCP Estuardo Merrill MD     CC: aortic stenosis s/p AVR, asc  08/19/2017   CO2 25.0 08/19/2017   GLU 64 08/19/2017   CA 8.2 08/19/2017   MG 2.2 08/19/2017   PGLU 104 08/19/2017     FS  8/18: 12p (9A), 8p 126  8/19: 2a 146 (19A), 6a 71-->104      A/P  77year old man with aortic stenosis s/p AVR, ascending ao

## 2017-08-19 NOTE — PROGRESS NOTES
[facility]  Progress Note    Tod Lei Patient Status:  Inpatient    1940 MRN [id number]   [facility] 6NE-A Attending Dmitry Edgar MD   Hosp Day # 2 PCP Durga Hobson MD     CC: Medical management    SUBJECTIVE: Complain  08/19/2017   K 3.9 08/19/2017    08/19/2017   CO2 25.0 08/19/2017   GLU 64 08/19/2017   CA 8.2 08/19/2017   MG 2.2 08/19/2017   PGLU 134 08/19/2017       Imaging:   XR CHEST AP PORTABLE (CPT=71010)     TECHNIQUE:  AP chest radiograph was obt HYDROcodone-acetaminophen (NORCO)  MG per tab 2 tablet 2 tablet Oral Q4H PRN   temazepam (RESTORIL) cap 15 mg 15 mg Oral Nightly PRN   Albumin Human (ALBUMINAR) 5 % solution 250 mL 250 mL Intravenous Once PRN   DOBUTamine in D5W (DOBUTREX) 500 mg/2 Or      Glucose-Vitamin C (DEX-4) 4-0.006 g chewable tab 4 tablet 4 tablet Oral Q15 Min PRN   Or      dextrose 50% injection 50 mL 50 mL Intravenous Q15 Min PRN   Or      glucose (DEX4) oral liquid 30 g 30 g Oral Q15 Min PRN   Or      Glucose-Vitamin C (

## 2017-08-19 NOTE — PLAN OF CARE
Assumed care for this patient at 0730. Patient alert and oriented x4. Patient has chronic and acute pain, PRN meds given with relief noted. Patient weaned off EPI, Brayden d/c'd.  Dr. Vandana Unger updated on CT output after putting patient in chair, orders received to

## 2017-08-19 NOTE — PROGRESS NOTES
[facility]   [facility] Progress Note    Fidel Stratton Patient Status:  Inpatient    1940 MRN [id number]   [facility] 6NE-A Attending Ignacio Welsh MD   Hosp Day # 2 PCP Antonio Jacobs MD     Subjective:  Some c/o chronic back pain Glucose-Vitamin C (DEX-4) 4-0.006 g chewable tab 4 tablet 4 tablet Oral Q15 Min PRN   Or      dextrose 50% injection 50 mL 50 mL Intravenous Q15 Min PRN   Or      glucose (DEX4) oral liquid 30 g 30 g Oral Q15 Min PRN   Or      Glucose-Vitamin C (DEX-4) 4 0.9 % 100 mL IVPB 3 g Intravenous PRN   magnesium sulfate IVPB premix 1 g 1 g Intravenous PRN   magnesium sulfate IVPB premix 2 g 2 g Intravenous PRN   mupirocin (BACTROBAN) 2% nasal ointment OINT 1 Application 1 Application Nasal BID   Midazolam HCl (VERS ([address]     Shortness of breath     Elevated troponin     Constipation     Aortic stenosis     S/P AVR     Anxiety state     BPH (benign prostatic hyperplasia)      POD# 2 S/P AVR w/ Resection/Replacement of AscAo  1) Fluid overload, lasix  2) Wean Dobs  3)

## 2017-08-19 NOTE — PROGRESS NOTES
Seen and examined. Notes reviewed.     Epinephrine just d/c -- remains on low dose dobutamine per Dr. Arminda Thakkar    PA pressures elevated   50/28      Sinus rhythm   Good urine output -- no excess bleeding    Lungs anterior pleural rub - thin pectus carinatum  Ht

## 2017-08-20 ENCOUNTER — APPOINTMENT (OUTPATIENT)
Dept: GENERAL RADIOLOGY | Facility: HOSPITAL | Age: 77
DRG: 220 | End: 2017-08-20
Attending: THORACIC SURGERY (CARDIOTHORACIC VASCULAR SURGERY)
Payer: MEDICARE

## 2017-08-20 ENCOUNTER — APPOINTMENT (OUTPATIENT)
Dept: GENERAL RADIOLOGY | Facility: HOSPITAL | Age: 77
DRG: 220 | End: 2017-08-20
Attending: INTERNAL MEDICINE
Payer: MEDICARE

## 2017-08-20 LAB
BUN BLD-MCNC: 33 MG/DL (ref 8–20)
CALCIUM BLD-MCNC: 8.3 MG/DL (ref 8.3–10.3)
CHLORIDE: 101 MMOL/L (ref 101–111)
CO2: 26 MMOL/L (ref 22–32)
CREAT BLD-MCNC: 1.04 MG/DL (ref 0.7–1.3)
GLUCOSE BLD-MCNC: 102 MG/DL (ref 65–99)
GLUCOSE BLD-MCNC: 104 MG/DL (ref 65–99)
GLUCOSE BLD-MCNC: 106 MG/DL (ref 65–99)
GLUCOSE BLD-MCNC: 107 MG/DL (ref 65–99)
GLUCOSE BLD-MCNC: 108 MG/DL (ref 65–99)
GLUCOSE BLD-MCNC: 109 MG/DL (ref 65–99)
GLUCOSE BLD-MCNC: 110 MG/DL (ref 65–99)
GLUCOSE BLD-MCNC: 112 MG/DL (ref 65–99)
GLUCOSE BLD-MCNC: 112 MG/DL (ref 65–99)
GLUCOSE BLD-MCNC: 119 MG/DL (ref 65–99)
GLUCOSE BLD-MCNC: 120 MG/DL (ref 65–99)
GLUCOSE BLD-MCNC: 120 MG/DL (ref 65–99)
GLUCOSE BLD-MCNC: 124 MG/DL (ref 65–99)
GLUCOSE BLD-MCNC: 125 MG/DL (ref 65–99)
GLUCOSE BLD-MCNC: 132 MG/DL (ref 65–99)
GLUCOSE BLD-MCNC: 133 MG/DL (ref 65–99)
GLUCOSE BLD-MCNC: 152 MG/DL (ref 65–99)
GLUCOSE BLD-MCNC: 157 MG/DL (ref 65–99)
GLUCOSE BLD-MCNC: 162 MG/DL (ref 65–99)
GLUCOSE BLD-MCNC: 167 MG/DL (ref 70–99)
GLUCOSE BLD-MCNC: 87 MG/DL (ref 65–99)
GLUCOSE BLD-MCNC: 94 MG/DL (ref 65–99)
POTASSIUM SERPL-SCNC: 4.9 MMOL/L (ref 3.6–5.1)
SODIUM SERPL-SCNC: 136 MMOL/L (ref 136–144)

## 2017-08-20 PROCEDURE — 99232 SBSQ HOSP IP/OBS MODERATE 35: CPT | Performed by: INTERNAL MEDICINE

## 2017-08-20 PROCEDURE — 71010 XR CHEST AP PORTABLE  (CPT=71010): CPT | Performed by: THORACIC SURGERY (CARDIOTHORACIC VASCULAR SURGERY)

## 2017-08-20 PROCEDURE — 71010 XR CHEST AP PORTABLE  (CPT=71010): CPT | Performed by: INTERNAL MEDICINE

## 2017-08-20 RX ORDER — FUROSEMIDE 10 MG/ML
20 INJECTION INTRAMUSCULAR; INTRAVENOUS DAILY
Status: DISCONTINUED | OUTPATIENT
Start: 2017-08-20 | End: 2017-08-23

## 2017-08-20 RX ORDER — LOSARTAN POTASSIUM 25 MG/1
25 TABLET ORAL DAILY
Status: DISCONTINUED | OUTPATIENT
Start: 2017-08-20 | End: 2017-08-24

## 2017-08-20 RX ORDER — SPIRONOLACTONE 25 MG/1
25 TABLET ORAL DAILY
Status: DISCONTINUED | OUTPATIENT
Start: 2017-08-20 | End: 2017-08-24

## 2017-08-20 RX ORDER — QUETIAPINE 25 MG/1
25 TABLET, FILM COATED ORAL NIGHTLY PRN
Status: DISCONTINUED | OUTPATIENT
Start: 2017-08-20 | End: 2017-08-20

## 2017-08-20 RX ORDER — QUETIAPINE 25 MG/1
25 TABLET, FILM COATED ORAL NIGHTLY PRN
Status: DISCONTINUED | OUTPATIENT
Start: 2017-08-20 | End: 2017-08-24

## 2017-08-20 NOTE — CONSULTS
[facility]    Kiley Lion Patient Status:  Inpatient    1940 MRN [id number]   [facility] 6NE-A Attending Vanessa Robertson MD   Hosp Day # 3 PCP Chalrey Harris MD     Patient Identification  Kiley Loin is a 77year old male. 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, recommended repeat exam in one year               to be sure unchanged      spironolactone (ALDACTONE) tab 25 mg 25 mg Oral Daily   Losartan Potassium (COZAAR) tab 25 mg 25 mg Oral Daily   furosemide (LASIX) injecti tablet Oral Q4H PRN   temazepam (RESTORIL) cap 15 mg 15 mg Oral Nightly PRN   Albumin Human (ALBUMINAR) 5 % solution 250 mL 250 mL Intravenous Once PRN   DOBUTamine in D5W (DOBUTREX) 500 mg/250 ml infusion 2.5-10 mcg/kg/min Intravenous Continuous PRN   nit Continuous   ipratropium-albuterol (DUONEB) nebulizer solution 3 mL 3 mL Nebulization Q4H PRN   EPINEPHrine HCl (ADRENALIN) 4 mg in sodium chloride 0.9 % 250 mL infusion 5 mcg/min Intravenous Continuous   glucose (DEX4) oral liquid 15 g 15 g Oral Q15 Min P Resonant, clear breath sounds, quiet accessory muscles. Chest wall:  No tenderness or deformity. Cardiovascular:  Heart with regular rate rhythm, no murmurs appreciated. Radial and pedal pulses good. No cyanosis in all extremities.    Abdomen:   No tend (and aggravating anemia) with anticoagulation    PLAN:                   Based on patient's current functional status, pt would benefit from acute inpatient rehabilitation, working with PT/OT/SLP to upgrade present functional status, provide family trainin

## 2017-08-20 NOTE — PROGRESS NOTES
[facility]  Endocrinology Progress Note    Gunnar Ohara Patient Status:  Inpatient    1940 MRN [id number]   [facility] 6NE-A Attending Alejo Agarwal MD   Hosp Day # 3 PCP Edson Fitzgerald MD     CC: aortic stenosis s/p AVR, asc CA 8.3 08/20/2017   PGLU 162 08/20/2017     FS  8/18: 12p (9A), 8p 126  8/19: 2a 146 (19A), 6a 71-->104, 10a - 500mg of solumedrol, 12p 134, 5p 179 (2A), 8p 248 (5A)  8/20: 7a 162 (1A)      A/P  77year old man with aortic stenosis s/p AVR, ascending aor

## 2017-08-20 NOTE — PROGRESS NOTES
Up in chair. Looks much better than yesterday yet intermittently confused. Did not sleep at all per nursing and received dilaudid last night.     Afebrile  138/83  85 regular -- brief PAF overnight by description -- off vasopressor/inotropic support    Lung

## 2017-08-20 NOTE — OCCUPATIONAL THERAPY NOTE
OCCUPATIONAL THERAPY TREATMENT NOTE - INPATIENT     Room Number: 9323/4918-E  Session: 1   Number of Visits to Meet Established Goals: 4    Presenting Problem: s/p AVR    History related to current admission: Pt admitted on 8/17/17 for heart murmur and sev TONSILLECTOMY  7/20/16: UPPER GI ENDOSCOPY,DIAGNOSIS      Comment: antral erosions (bx negative for H. 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 t therapist assist pt to complete mobility, education. Pt with increased attention in this session compared to last session. Patient End of Session: Up in chair; With [facility] staff;Needs met;Call light within reach;RN aware of session/findings; All patient questi cue for redirection.

## 2017-08-20 NOTE — PROGRESS NOTES
[facility]   CVS Progress Note    Jade Mireles Patient Status:  Inpatient    1940 MRN [id number]   [facility] 6NE-A Attending Bernice Taylor MD   Hosp Day # 3 PCP Zuleyma Powers MD     Subjective:  Some c/o pain, chronic ted (DEX-4) 4-0.006 g chewable tab 4 tablet 4 tablet Oral Q15 Min PRN   Or      dextrose 50% injection 50 mL 50 mL Intravenous Q15 Min PRN   Or      glucose (DEX4) oral liquid 30 g 30 g Oral Q15 Min PRN   Or      Glucose-Vitamin C (DEX-4) 4-0.006 g chewable ta Intravenous PRN   magnesium sulfate IVPB premix 1 g 1 g Intravenous PRN   magnesium sulfate IVPB premix 2 g 2 g Intravenous PRN   mupirocin (BACTROBAN) 2% nasal ointment OINT 1 Application 1 Application Nasal BID   Midazolam HCl (VERSED) 2 MG/2ML injection Aortic stenosis     S/P AVR     Anxiety state     BPH (benign prostatic hyperplasia)      POD# 3 S/P AVR w/ Resection/Replacement of AscAo  1) Fluid overload, lasix  2) Pain management  3) D/C Chest Tubes today  4) Ambulate [place]  5) Keep in CNICU today

## 2017-08-20 NOTE — PROGRESS NOTES
[facility]  Progress Note    Celena Chang Patient Status:  Inpatient    1940 MRN [id number]   [facility] 6NE-A Attending Oscar Griffin MD   Hosp Day # 3 PCP Beryle Session, MD     CC: Medical management    SUBJECTIVE: Complain 08/20/2017    08/20/2017   CO2 26.0 08/20/2017    08/20/2017   CA 8.3 08/20/2017   PGLU 162 08/20/2017       Imaging:   XR CHEST AP PORTABLE (CPT=71010)     TECHNIQUE:  AP chest radiograph was obtained.      COMPARISON:  [facility] , XR CHEST AP PO tab 1 tablet 1 tablet Oral Q4H PRN   Or      HYDROcodone-acetaminophen (NORCO)  MG per tab 2 tablet 2 tablet Oral Q4H PRN   temazepam (RESTORIL) cap 15 mg 15 mg Oral Nightly PRN   Albumin Human (ALBUMINAR) 5 % solution 250 mL 250 mL Intravenous Once oral liquid 15 g 15 g Oral Q15 Min PRN   Or      Glucose-Vitamin C (DEX-4) 4-0.006 g chewable tab 4 tablet 4 tablet Oral Q15 Min PRN   Or      dextrose 50% injection 50 mL 50 mL Intravenous Q15 Min PRN   Or      glucose (DEX4) oral liquid 30 g 30 g Oral Q1

## 2017-08-20 NOTE — PLAN OF CARE
Assumed care for this patient at 0730. Patient alert and pleasantly confused. Patient thought he was having surgery today even after multiple re-orientations. Patient also asks if he is going home today and if he is in his kitchen. Reorienting continuous.

## 2017-08-20 NOTE — PHYSICAL THERAPY NOTE
PHYSICAL THERAPY TREATMENT NOTE - INPATIENT    Room Number: 0444/1595-P     Session: 1   Number of Visits to Meet Established Goals: 5    Presenting Problem: s/p AVR 8/17/17  History related to current admission: Pt is a 77 y.o.  Male admitted 8/17/17 s/p UPPER GI ENDOSCOPY,DIAGNOSIS      Comment: antral erosions (bx negative for H. 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 Pt recalls 0/3 sternal precautions.  Pt ed training;Transfer training;Balance training  Rehab Potential : Good  Frequency (Obs): 5x/week    CURRENT GOALS     Goal #1 Patient is able to demonstrate supine - sit EOB @ level: modified independent   Goal #2 Patient is able to demonstrate transfers EOB

## 2017-08-20 NOTE — CM/SW NOTE
sw notified by therapy that they have changed their recommendation to acute rehab. RN to obtain order for PMR consult.  [facility] referral made to [facility] for eval. sw to follow    [address] [facility]

## 2017-08-21 LAB
ATRIAL RATE: 153 BPM
BASOPHILS # BLD AUTO: 0.01 X10(3) UL (ref 0–0.1)
BASOPHILS NFR BLD AUTO: 0.1 %
BUN BLD-MCNC: 41 MG/DL (ref 8–20)
CALCIUM BLD-MCNC: 8.1 MG/DL (ref 8.3–10.3)
CHLORIDE: 100 MMOL/L (ref 101–111)
CO2: 26 MMOL/L (ref 22–32)
CREAT BLD-MCNC: 1.04 MG/DL (ref 0.7–1.3)
EOSINOPHIL # BLD AUTO: 0 X10(3) UL (ref 0–0.3)
EOSINOPHIL NFR BLD AUTO: 0 %
ERYTHROCYTE [DISTWIDTH] IN BLOOD BY AUTOMATED COUNT: 13.2 % (ref 11.5–16)
GLUCOSE BLD-MCNC: 107 MG/DL (ref 65–99)
GLUCOSE BLD-MCNC: 112 MG/DL (ref 65–99)
GLUCOSE BLD-MCNC: 115 MG/DL (ref 65–99)
GLUCOSE BLD-MCNC: 132 MG/DL (ref 70–99)
GLUCOSE BLD-MCNC: 134 MG/DL (ref 65–99)
GLUCOSE BLD-MCNC: 154 MG/DL (ref 65–99)
HCT VFR BLD AUTO: 26 % (ref 37–53)
HEPARIN INDUCED PLT ANTIBODY: NEGATIVE
HGB BLD-MCNC: 8.9 G/DL (ref 13–17)
IMMATURE GRANULOCYTE COUNT: 0.05 X10(3) UL (ref 0–1)
IMMATURE GRANULOCYTE RATIO %: 0.3 %
LYMPHOCYTES # BLD AUTO: 0.62 X10(3) UL (ref 0.9–4)
LYMPHOCYTES NFR BLD AUTO: 4.2 %
MCH RBC QN AUTO: 32.8 PG (ref 27–33.2)
MCHC RBC AUTO-ENTMCNC: 34.2 G/DL (ref 31–37)
MCV RBC AUTO: 95.9 FL (ref 80–99)
MONOCYTES # BLD AUTO: 1.59 X10(3) UL (ref 0.1–0.6)
MONOCYTES NFR BLD AUTO: 10.7 %
NEUTROPHIL ABS PRELIM: 12.62 X10 (3) UL (ref 1.3–6.7)
NEUTROPHILS # BLD AUTO: 12.62 X10(3) UL (ref 1.3–6.7)
NEUTROPHILS NFR BLD AUTO: 84.7 %
PLATELET # BLD AUTO: 115 10(3)UL (ref 150–450)
POTASSIUM SERPL-SCNC: 4.6 MMOL/L (ref 3.6–5.1)
Q-T INTERVAL: 368 MS
QRS DURATION: 142 MS
QTC CALCULATION (BEZET): 486 MS
R AXIS: -65 DEGREES
RBC # BLD AUTO: 2.71 X10(6)UL (ref 3.8–5.8)
RED CELL DISTRIBUTION WIDTH-SD: 46.1 FL (ref 35.1–46.3)
SODIUM SERPL-SCNC: 134 MMOL/L (ref 136–144)
T AXIS: 96 DEGREES
VENTRICULAR RATE: 105 BPM
WBC # BLD AUTO: 14.9 X10(3) UL (ref 4–13)

## 2017-08-21 PROCEDURE — 99232 SBSQ HOSP IP/OBS MODERATE 35: CPT | Performed by: INTERNAL MEDICINE

## 2017-08-21 RX ORDER — DIAZEPAM 5 MG/1
5 TABLET ORAL 2 TIMES DAILY PRN
Status: DISCONTINUED | OUTPATIENT
Start: 2017-08-21 | End: 2017-08-24

## 2017-08-21 RX ORDER — CARVEDILOL 3.12 MG/1
3.12 TABLET ORAL 2 TIMES DAILY WITH MEALS
Status: DISCONTINUED | OUTPATIENT
Start: 2017-08-21 | End: 2017-08-23

## 2017-08-21 RX ORDER — FUROSEMIDE 10 MG/ML
20 INJECTION INTRAMUSCULAR; INTRAVENOUS ONCE
Status: COMPLETED | OUTPATIENT
Start: 2017-08-21 | End: 2017-08-21

## 2017-08-21 RX ORDER — CARVEDILOL 6.25 MG/1
6.25 TABLET ORAL 2 TIMES DAILY WITH MEALS
Status: DISCONTINUED | OUTPATIENT
Start: 2017-08-21 | End: 2017-08-21

## 2017-08-21 NOTE — PHYSICAL THERAPY NOTE
PHYSICAL THERAPY TREATMENT NOTE - INPATIENT    Room Number: 6374/7563-D     Session: 2   Number of Visits to Meet Established Goals: 5     History related to current admission: Pt is a 77 y.o. Male admitted 8/17/17 s/p elective AVR.  Pt with history of mul TONSILLECTOMY  7/20/16: UPPER GI ENDOSCOPY,DIAGNOSIS      Comment: antral erosions (bx negative for H. 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 t tested  Comment : above per FIM    Skilled Therapy Provided: Patient received seated in bathroom with RN. Transfers to stand with min a for leverage and cues for technique.  Gait trained with rw with min a for balance, patient refusing further ambulation o restrictive at assistance level: sup   Goal #4 Pt will recall 3/3 sternal precautions independently.     Goal #5 Pt will ascend/descend 1 flight of stairs with min a   Goal #6     Goal Comments: Goals established on 8/18/2017, all goals ongoing 8/21/17

## 2017-08-21 NOTE — PROGRESS NOTES
POD # 4 s/p AVR/AscAo  D/w Dr. Toan Jones, seen after recent ambulation. Mentation improving although wife reports confusion at night. Diuresis per cards. Met with patient and wife to discuss discharge planning, post op expectations.  Pt seen ambulating in ha

## 2017-08-21 NOTE — PROGRESS NOTES
[facility]  Progress Note    Cassius Swartz Patient Status:  Inpatient    1940 MRN [id number]   [facility] 6NE-A Attending Doretha Bender MD   Hosp Day # 4 PCP Peggy Lopez MD       Subjective:  Feels breathing at baseline.  Pa mg Oral Daily   • furosemide  20 mg Intravenous Daily   • digoxin  125 mcg Oral Daily   • amiodarone HCl  400 mg Oral BID with meals   • Insulin Aspart Pen  1-10 Units Subcutaneous TID AC and HS   • carvedilol  3.125 mg Oral BID with meals   • aspirin  325

## 2017-08-21 NOTE — PROGRESS NOTES
[facility]  Endocrinology Progress Note    Allison Moreno Patient Status:  Inpatient    1940 MRN [id number]   [facility] 6NE-A Attending Melissa Abreu MD   Hosp Day # 4 PCP Vita Gil MD     CC: aortic stenosis s/p AVR, asc 08/21/2017    08/21/2017   K 4.6 08/21/2017    08/21/2017   CO2 26.0 08/21/2017    08/21/2017   CA 8.1 08/21/2017   PGLU 134 08/21/2017     A/P  77year old man with aortic stenosis s/p AVR, ascending aortic aneurysm s/p replacement 8/17

## 2017-08-21 NOTE — PLAN OF CARE
CARDIOVASCULAR - ADULT    • Maintains optimal cardiac output and hemodynamic stability Progressing          RESPIRATORY - ADULT    • Achieves optimal ventilation and oxygenation Progressing        Received patient at 0730 awake and alert.   Patient states h

## 2017-08-21 NOTE — OCCUPATIONAL THERAPY NOTE
OCCUPATIONAL THERAPY TREATMENT NOTE - INPATIENT     Room Number: 2350/2926-W  Session: 2  Number of Visits to Meet Established Goals: 4    Presenting Problem: s/p AVR    History related to current admission: Pt admitted on 8/17/17 for heart murmur and rito TONSILLECTOMY  7/20/16: UPPER GI ENDOSCOPY,DIAGNOSIS      Comment: antral erosions (bx negative for H. 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 t Redirection for attention needed multiple times through out session. Patient End of Session: Up in chair;Needs met;Call light within reach;RN aware of session/findings; All patient questions and concerns addressed;SCDs in place    ASSESSMENT   Pt partic perform all ADLs: with supervision and with good judgement/safety and within sternal precautions.     Functional Transfer Goals  Patient will perform all functional transfers:  with supervision and and within sternal precautions.     UE Exercise Program Go

## 2017-08-21 NOTE — PROGRESS NOTES
[facility]  Progress Note    Fidel Stratton Patient Status:  Inpatient    1940 MRN [id number]   [facility] 6NE-A Attending Ignacio Welsh MD   Hosp Day # 4 PCP Antonio Jacobs MD     CC: Medical management    SUBJECTIVE: Complain .0 08/21/2017   CREATSERUM 1.04 08/21/2017   BUN 41 08/21/2017    08/21/2017   K 4.6 08/21/2017    08/21/2017   CO2 26.0 08/21/2017    08/21/2017   CA 8.1 08/21/2017   PGLU 134 08/21/2017       Imaging:   XR CHEST AP PORTABLE (C Q15 Min PRN   Or      Glucose-Vitamin C (DEX-4) 4-0.006 g chewable tab 8 tablet 8 tablet Oral Q15 Min PRN   aspirin tab 325 mg 325 mg Oral Daily   Alfuzosin HCl ER (UROXATRAL) 24 hr tab 10 mg 10 mg Oral Daily with breakfast   HYDROcodone-acetaminophen (NOR cardiology. 2. Chronic systolic heart failure-cardiology following  3. Hypertension-on coreg  4. BPH-continue Flomax   5. Anxiety- patient takes valium at home for this, continued at this time  6. Chronic back pain  7. Anemia  8.  Hypokalemia-replace prn

## 2017-08-22 ENCOUNTER — APPOINTMENT (OUTPATIENT)
Dept: CV DIAGNOSTICS | Facility: HOSPITAL | Age: 77
DRG: 220 | End: 2017-08-22
Attending: PHYSICIAN ASSISTANT
Payer: MEDICARE

## 2017-08-22 ENCOUNTER — APPOINTMENT (OUTPATIENT)
Dept: GENERAL RADIOLOGY | Facility: HOSPITAL | Age: 77
DRG: 220 | End: 2017-08-22
Attending: PHYSICIAN ASSISTANT
Payer: MEDICARE

## 2017-08-22 PROBLEM — E87.1 HYPONATREMIA: Status: ACTIVE | Noted: 2017-08-22

## 2017-08-22 LAB
BASOPHILS # BLD AUTO: 0.01 X10(3) UL (ref 0–0.1)
BASOPHILS NFR BLD AUTO: 0.1 %
BUN BLD-MCNC: 29 MG/DL (ref 8–20)
CALCIUM BLD-MCNC: 8.1 MG/DL (ref 8.3–10.3)
CHLORIDE: 100 MMOL/L (ref 101–111)
CO2: 27 MMOL/L (ref 22–32)
CREAT BLD-MCNC: 0.88 MG/DL (ref 0.7–1.3)
EOSINOPHIL # BLD AUTO: 0 X10(3) UL (ref 0–0.3)
EOSINOPHIL NFR BLD AUTO: 0 %
ERYTHROCYTE [DISTWIDTH] IN BLOOD BY AUTOMATED COUNT: 13.2 % (ref 11.5–16)
GLUCOSE BLD-MCNC: 102 MG/DL (ref 65–99)
GLUCOSE BLD-MCNC: 107 MG/DL (ref 65–99)
GLUCOSE BLD-MCNC: 116 MG/DL (ref 65–99)
GLUCOSE BLD-MCNC: 149 MG/DL (ref 65–99)
GLUCOSE BLD-MCNC: 97 MG/DL (ref 70–99)
HCT VFR BLD AUTO: 26 % (ref 37–53)
HGB BLD-MCNC: 8.9 G/DL (ref 13–17)
IMMATURE GRANULOCYTE COUNT: 0.03 X10(3) UL (ref 0–1)
IMMATURE GRANULOCYTE RATIO %: 0.3 %
LYMPHOCYTES # BLD AUTO: 0.88 X10(3) UL (ref 0.9–4)
LYMPHOCYTES NFR BLD AUTO: 10.2 %
MCH RBC QN AUTO: 32.7 PG (ref 27–33.2)
MCHC RBC AUTO-ENTMCNC: 34.2 G/DL (ref 31–37)
MCV RBC AUTO: 95.6 FL (ref 80–99)
MONOCYTES # BLD AUTO: 1.27 X10(3) UL (ref 0.1–0.6)
MONOCYTES NFR BLD AUTO: 14.8 %
NEUTROPHIL ABS PRELIM: 6.41 X10 (3) UL (ref 1.3–6.7)
NEUTROPHILS # BLD AUTO: 6.41 X10(3) UL (ref 1.3–6.7)
NEUTROPHILS NFR BLD AUTO: 74.6 %
PLATELET # BLD AUTO: 124 10(3)UL (ref 150–450)
POTASSIUM SERPL-SCNC: 4 MMOL/L (ref 3.6–5.1)
RBC # BLD AUTO: 2.72 X10(6)UL (ref 3.8–5.8)
RED CELL DISTRIBUTION WIDTH-SD: 46 FL (ref 35.1–46.3)
SODIUM SERPL-SCNC: 135 MMOL/L (ref 136–144)
WBC # BLD AUTO: 8.6 X10(3) UL (ref 4–13)

## 2017-08-22 PROCEDURE — 71010 XR CHEST AP PORTABLE  (CPT=71010): CPT | Performed by: PHYSICIAN ASSISTANT

## 2017-08-22 PROCEDURE — 99232 SBSQ HOSP IP/OBS MODERATE 35: CPT | Performed by: INTERNAL MEDICINE

## 2017-08-22 PROCEDURE — 93306 TTE W/DOPPLER COMPLETE: CPT | Performed by: PHYSICIAN ASSISTANT

## 2017-08-22 RX ORDER — HYDROMORPHONE HYDROCHLORIDE 2 MG/1
4 TABLET ORAL EVERY 4 HOURS PRN
Status: DISCONTINUED | OUTPATIENT
Start: 2017-08-22 | End: 2017-08-23

## 2017-08-22 NOTE — PROGRESS NOTES
[facility]  Progress Note    Claudia Gagnon Patient Status:  Inpatient    1940 MRN [id number]   [facility] 6NE-A Attending [place], MD   Hosp Day # 5 PCP Aydee Tay MD       Assessment:    · S/P AVR/Asc Ao replacement K 4.0 08/22/2017    08/22/2017   CO2 27.0 08/22/2017   GLU 97 08/22/2017   CA 8.1 08/22/2017       Medications:    • carvedilol  3.125 mg Oral BID with meals   • spironolactone  25 mg Oral Daily   • Losartan Potassium  25 mg Oral Daily   • furosemi

## 2017-08-22 NOTE — OCCUPATIONAL THERAPY NOTE
OCCUPATIONAL THERAPY TREATMENT NOTE - INPATIENT     Room Number: 4579/4077-T  Session: 2  Number of Visits to Meet Established Goals: 4    Presenting Problem: s/p AVR    History related to current admission: Pt admitted on 8/17/17 for heart murmur and rito TONSILLECTOMY  7/20/16: UPPER GI ENDOSCOPY,DIAGNOSIS      Comment: antral erosions (bx negative for H. 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 Patient End of Session: In bed;Needs met;Call light within reach;RN aware of session/findings; All patient questions and concerns addressed;SCDs in place    ASSESSMENT   Pt participated in session 3 of 4 following being admitted on 8/17/2017 for with bilateral AROM HEP (home exercise program).     Additional Goals:  Pt will recall 3/3 sternal precautions with no cues needed. Pt will identify 3 energy conservation strategies that can be used upon return home.    Pt will maintain attention during th

## 2017-08-22 NOTE — PROGRESS NOTES
[facility]  Progress Note    Yajaira South Patient Status:  Inpatient    1940 MRN [id number]   [facility] 6NE-A Attending Adelaida Arenas MD   Hosp Day # 5 PCP Serge Becker MD     CC: Medical management    SUBJECTIVE: Complain 08/22/2017   BUN 29 08/22/2017    08/22/2017   K 4.0 08/22/2017    08/22/2017   CO2 27.0 08/22/2017   GLU 97 08/22/2017   CA 8.1 08/22/2017   PGLU 102 08/22/2017       Imaging:   XR CHEST AP PORTABLE (CPT=71010)     TECHNIQUE:  AP chest radiogr 325 mg Oral Daily   Alfuzosin HCl ER (UROXATRAL) 24 hr tab 10 mg 10 mg Oral Daily with breakfast   Albumin Human (ALBUMINAR) 5 % solution 250 mL 250 mL Intravenous Once PRN   nitroGLYCERIN infusion 50mg in D5W 250ml 5-300 mcg/min Intravenous Continuous PRN Code  · Quintin: Present     Plan of care discussed with RN    Estimated date of discharge:  To be decided  Discharge is dependent on: Clinical progress, CT surgery  recommendations  At this point Mr. Latoya Scott  is expected to be discharge to: acute rehab      Boby Leon

## 2017-08-22 NOTE — PHYSICAL THERAPY NOTE
PHYSICAL THERAPY TREATMENT NOTE - INPATIENT    Room Number: 9356/4201-M     Session: 3  Number of Visits to Meet Established Goals: 5     History related to current admission: Pt is a 77 y.o. Male admitted 8/17/17 s/p elective AVR.  Pt with history of mult TONSILLECTOMY  7/20/16: UPPER GI ENDOSCOPY,DIAGNOSIS      Comment: antral erosions (bx negative for H. 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 t tested  Comment : above per FIM    Skilled Therapy Provided: Patient received seated in bathroom with RN. Transfers to stand with CGA for balance/safety. Gait trained with RW and CGA for balance/safety.  Pt ambulates with steady gait pattern and slow cadenc level: modified independent   Goal #2 Patient is able to demonstrate transfers EOB to/from [facility] at assistance level: sup   Goal #3 Patient is able to ambulate 150 feet with assist device: least restrictive at assistance level: sup   Goal #4 Pt will recall 3

## 2017-08-22 NOTE — PROGRESS NOTES
[facility]  CV Surgery Progress Note    Myrtie Revering Patient Status:  Inpatient    1940 MRN [id number]   [facility] 6NE-A Attending Rosalina Bailey MD   Hosp Day # 5 PCP Kenneth Mcdowell MD     Subjective:  Doing well overall.  Co

## 2017-08-23 LAB
GLUCOSE BLD-MCNC: 103 MG/DL (ref 65–99)
GLUCOSE BLD-MCNC: 115 MG/DL (ref 65–99)
GLUCOSE BLD-MCNC: 116 MG/DL (ref 65–99)
GLUCOSE BLD-MCNC: 120 MG/DL (ref 65–99)

## 2017-08-23 PROCEDURE — 99232 SBSQ HOSP IP/OBS MODERATE 35: CPT | Performed by: INTERNAL MEDICINE

## 2017-08-23 RX ORDER — CARVEDILOL 6.25 MG/1
6.25 TABLET ORAL 2 TIMES DAILY WITH MEALS
Status: DISCONTINUED | OUTPATIENT
Start: 2017-08-23 | End: 2017-08-24

## 2017-08-23 RX ORDER — FUROSEMIDE 20 MG/1
20 TABLET ORAL DAILY
Status: DISCONTINUED | OUTPATIENT
Start: 2017-08-24 | End: 2017-08-24

## 2017-08-23 RX ORDER — ACETAMINOPHEN 500 MG
500 TABLET ORAL EVERY 6 HOURS PRN
Status: DISCONTINUED | OUTPATIENT
Start: 2017-08-23 | End: 2017-08-24

## 2017-08-23 RX ORDER — HYDROMORPHONE HYDROCHLORIDE 2 MG/1
8 TABLET ORAL EVERY 4 HOURS PRN
Status: DISCONTINUED | OUTPATIENT
Start: 2017-08-23 | End: 2017-08-23

## 2017-08-23 RX ORDER — HYDROMORPHONE HYDROCHLORIDE 2 MG/1
8 TABLET ORAL EVERY 6 HOURS PRN
Status: DISCONTINUED | OUTPATIENT
Start: 2017-08-23 | End: 2017-08-24

## 2017-08-23 NOTE — PROGRESS NOTES
Assumed care of pt. At 1700. Passed night time medications and did head to toe assessment. Sternal incision CDI. No evidence of hematoma, bleeding, or oozing. Lungs clear on RA upon assessment. Abdomen soft and non tender to touch.   Active bowel sound

## 2017-08-23 NOTE — PROGRESS NOTES
[facility]  Progress Note    Celena Chang Patient Status:  Inpatient    1940 MRN [id number]   [facility] 6NE-A Attending Oscar Griffin MD   Hosp Day # 6 PCP Beryle Session, MD     CC: Medical management    SUBJECTIVE: Complain obtained. COMPARISON:  ANA PAULA GARCIA CHEST AP PORTABLE  (CPT=71010), 8/17/2017, 17:18.      INDICATIONS:  post cardiac surgery     PATIENT STATED HISTORY: (As transcribed by Technologist)  Patient offered no additional history at this time.         ===== infusion 50mg in D5W 250ml 5-300 mcg/min Intravenous Continuous PRN   norepinephrine (LEVOPHED) 4 mg/250 ml premix infusion 0.5-30 mcg/min Intravenous Continuous PRN   Nitroprusside Sodium (NIPRIDE) 50 mg in dextrose 5 % 250 mL infusion 0.1-4 mcg/kg/min In Clinical progress, CT surgery and cardiology recommendations  At this point Mr. Janna Cassidy  is expected to be discharge to: acute rehab      Questions/concerns and Plan of care were discussed with patient      Mayuri Leone MD  8/23/2017

## 2017-08-23 NOTE — PROGRESS NOTES
Assumed care for patient at [facility] on 8/22. Patient post op AVR day #6. A&O X4. VSS on RA. SR with BBB. . Lungs clear/diminished. SOB with exertion. Patient refused PRN neb treatment. IS encouraged. PO Dilaudid administered for chronic back pain.

## 2017-08-23 NOTE — HOME CARE LIAISON
DIAGNOSES AND PERTINENT MEDICAL HISTORY: S/P AVR     FACILITY NAME AND DC DATE:[facility]- PENDING D/C    BEDSIDE VISIT WITH:MET WITH PATIENT AND SPOUSE AND BEDSIDE TO DISCUSS HH SERVICES, PATIENT AND SPOUSE AGREEABLE TO [facility] SERVICES RN/PT    SERVICES ORDERED:

## 2017-08-23 NOTE — CARDIAC REHAB
CARD/AVR teaching complete cardiac rehab offered pt is very interested but is going to [facility] for Physical rehab first. Pt will call for CR appointment

## 2017-08-23 NOTE — PROGRESS NOTES
[facility]  Cardiology Progress Note    Subjective:  No chest pain or shortness of breath. Denies any new complaints.     Objective:  /62 (BP Location: Left arm)   Pulse 91   Temp 98 °F (36.7 °C) (Oral)   Resp 20   Ht 172.7 cm (5' 8\")   Wt 139 night. VERO Gupta  8/23/2017  9:07 AM    Patient seen and examined. Agree with the findings and plan of care.   Possible discharge in am.

## 2017-08-23 NOTE — CM/SW NOTE
Updated MJ on plan    Luis Galindo RN/CM  NICU  [phone]      Spoke pt/ot and feels he is ok for home now. Pt wants to talk with me. Met with pt and wife at bedside. Pt feels he is much stronger and did stairs today and was surprised.  Wife feel

## 2017-08-23 NOTE — PHYSICAL THERAPY NOTE
PHYSICAL THERAPY TREATMENT NOTE - INPATIENT    Room Number: 9326/0487-M  Session: 4  Number of Visits to Meet Established Goals: 5     History related to current admission: Pt is a 77 y.o. Male admitted 8/17/17 s/p elective AVR.  Pt with history of multipl TONSILLECTOMY  7/20/16: UPPER GI ENDOSCOPY,DIAGNOSIS      Comment: antral erosions (bx negative for H. 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 t Assistance: Not tested (full flight of stairs at supervision with bilateral railings)  Comment : above scores based on dept protocol    Skilled Therapy Provided:     Pt up to RW with PCT ambulating in hallway upon PT/OT arrival.  Pt in agreement to trial s housemaking)     PLAN  PT Treatment Plan: Bed mobility; Endurance; Energy conservation;Patient education; Family education;Gait training;Strengthening;Stair training;Transfer training;Balance training  Rehab Potential : Good  Frequency (Obs): 5x/week    CURRMARCOS

## 2017-08-23 NOTE — PROGRESS NOTES
[facility]  CV Surgery Progress Note    Celena Chang Patient Status:  Inpatient    1940 MRN [id number]   [facility] 6NE-A Attending Oscar Griffin MD   Hosp Day # 6 PCP Beryle Session, MD     Subjective:  Pt is doing well.  No n

## 2017-08-23 NOTE — OCCUPATIONAL THERAPY NOTE
OCCUPATIONAL THERAPY TREATMENT NOTE - INPATIENT     Room Number: 1474/9811-N  Session: 4  Number of Visits to Meet Established Goals: 4    Presenting Problem: s/p AVR    History related to current admission: Pt admitted on 8/17/17 for heart murmur and rito TONSILLECTOMY  7/20/16: UPPER GI ENDOSCOPY,DIAGNOSIS      Comment: antral erosions (bx negative for H. 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 Patient End of Session: In bed;Needs met;Call light within reach;RN aware of session/findings; All patient questions and concerns addressed;SCDs in place    ASSESSMENT   Pt participated in session 4 of 4 following being admitted on 8/17/2017 for redirection.

## 2017-08-24 VITALS
HEIGHT: 68 IN | TEMPERATURE: 99 F | BODY MASS INDEX: 21.09 KG/M2 | RESPIRATION RATE: 18 BRPM | OXYGEN SATURATION: 94 % | HEART RATE: 66 BPM | DIASTOLIC BLOOD PRESSURE: 58 MMHG | SYSTOLIC BLOOD PRESSURE: 110 MMHG | WEIGHT: 139.13 LBS

## 2017-08-24 LAB — GLUCOSE BLD-MCNC: 130 MG/DL (ref 65–99)

## 2017-08-24 PROCEDURE — 99232 SBSQ HOSP IP/OBS MODERATE 35: CPT | Performed by: INTERNAL MEDICINE

## 2017-08-24 RX ORDER — CARVEDILOL 6.25 MG/1
6.25 TABLET ORAL 2 TIMES DAILY WITH MEALS
Qty: 60 TABLET | Refills: 3 | Status: SHIPPED | OUTPATIENT
Start: 2017-08-24

## 2017-08-24 RX ORDER — SPIRONOLACTONE 25 MG/1
25 TABLET ORAL DAILY
Qty: 30 TABLET | Refills: 3 | Status: SHIPPED | OUTPATIENT
Start: 2017-08-24

## 2017-08-24 RX ORDER — AMIODARONE HYDROCHLORIDE 200 MG/1
200 TABLET ORAL DAILY
Qty: 30 TABLET | Refills: 3 | Status: SHIPPED | OUTPATIENT
Start: 2017-08-24

## 2017-08-24 RX ORDER — FUROSEMIDE 20 MG/1
20 TABLET ORAL DAILY
Qty: 30 TABLET | Refills: 3 | Status: SHIPPED | OUTPATIENT
Start: 2017-08-24

## 2017-08-24 RX ORDER — HYDROMORPHONE HYDROCHLORIDE 8 MG/1
8 TABLET ORAL EVERY 6 HOURS PRN
Qty: 30 TABLET | Refills: 0 | Status: SHIPPED | OUTPATIENT
Start: 2017-08-24

## 2017-08-24 RX ORDER — DIGOXIN 125 MCG
125 TABLET ORAL DAILY
Qty: 30 TABLET | Refills: 3 | Status: SHIPPED | OUTPATIENT
Start: 2017-08-24

## 2017-08-24 RX ORDER — LOSARTAN POTASSIUM 25 MG/1
25 TABLET ORAL DAILY
Qty: 30 TABLET | Refills: 3 | Status: SHIPPED | OUTPATIENT
Start: 2017-08-24

## 2017-08-24 NOTE — PROGRESS NOTES
[facility]  Progress Note    Verónica Pepito Patient Status:  Inpatient    1940 MRN [id number]   [facility] 6NE-A Attending Deborah Segovia MD   Hosp Day # 7 PCP Jason Nevarez MD     CC: Medical management    SUBJECTIVE: expected Date   [facility] 130 08/24/2017       Imaging:   XR CHEST AP PORTABLE (CPT=71010)     TECHNIQUE:  AP chest radiograph was obtained. COMPARISON:  ANA PAULA GARCIA CHEST AP PORTABLE  (CPT=71010), 8/17/2017, 17:18.      INDICATIONS:  post cardiac surgery     PATIENT breakfast   Albumin Human (ALBUMINAR) 5 % solution 250 mL 250 mL Intravenous Once PRN   nitroGLYCERIN infusion 50mg in D5W 250ml 5-300 mcg/min Intravenous Continuous PRN   norepinephrine (LEVOPHED) 4 mg/250 ml premix infusion 0.5-30 mcg/min Intravenous Con point Mr. Catracho Cruz  is expected to be discharge to: Home with HHPTOT      Questions/concerns and Plan of care were discussed with patient  Being d/samuel today by CT surgeon on cardiac meds per cardiology  Follow up with CV suregon and cardiology as directed and r

## 2017-08-24 NOTE — PROGRESS NOTES
[facility]  Progress Note    Frida Mason Patient Status:  Inpatient    1940 MRN [id number]   [facility] 8NE-A Attending Samuel Gonzalez MD   Hosp Day # 7 PCP Galen Melendez MD       Assessment:    · S/P AVR/Asc Ao replacement mg Oral BID with meals   • spironolactone  25 mg Oral Daily   • Losartan Potassium  25 mg Oral Daily   • digoxin  125 mcg Oral Daily   • amiodarone HCl  400 mg Oral BID with meals   • Insulin Aspart Pen  1-10 Units Subcutaneous TID AC and HS   • aspirin  3

## 2017-08-24 NOTE — PHYSICAL THERAPY NOTE
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 Ambulated another 75 ft back to room. Stand to sit with mod I. Wife instructed to stand by patient the first couple of days during stairs for safety.          Patient End of Session

## 2017-08-24 NOTE — PLAN OF CARE
Assumed patient care at 0700. Patient alert/oriented x4. Sinus rhythm on telemetry. Reports \"mild to moderate pain\" in back, which is baseline for patient; denies sternal pain. Ambulating in clark with standby assist and walker; tolerating well.   Helder

## 2017-08-24 NOTE — PROGRESS NOTES
NURSING DISCHARGE NOTE    Discharged Home via Wheelchair. Accompanied by Spouse  Belongings Taken by patient/family. Patient alert/oriented x4. Iv and telemetry removed prior to discharge.   Discharge instructions, medication list, prescriptions, l

## 2017-08-24 NOTE — OCCUPATIONAL THERAPY NOTE
OCCUPATIONAL THERAPY TREATMENT NOTE - INPATIENT     Room Number: 3513/0985-D  Session: 1/2 additional sessions  Number of Visits to Meet Established Goals: 4    Presenting Problem: s/p AVR    History related to current admission: Pt admitted on 8/17/17 for surgeries  No date: TONSILLECTOMY  7/20/16: UPPER GI ENDOSCOPY,DIAGNOSIS      Comment: antral erosions (bx negative for H. plyori),                Submucosal lesion in the proximal body of the                stomach on the anterior surface, likely spindle solved through scenarios related to how patient can return to daily life/skills without much interruption, utilizing these techniques.  Pt education on BUE AROM HEP from CV Binder, Per protocol, with good return demo.  Education also included community re-i Good  Frequency (Obs): Daily      OT Goals:   ADL Goals  Patient will perform all ADLs: with supervision and with good judgement/safety and within sternal precautions.     Functional Transfer Goals  Patient will perform all functional transfers:  with supe

## 2017-08-24 NOTE — PROGRESS NOTES
[facility]  Progress Note    Casper Garvin Patient Status:  Inpatient    1940 MRN [id number]   [facility] 8NE-A Attending Mane Arias MD   Hosp Day # 7 PCP Davis Velasco MD     Subjective:  Pt doing well.  Pain is controlled ffrom our standpoint    Lauren Moreno  8/24/2017  9:25 AM

## 2017-08-24 NOTE — PLAN OF CARE
Diabetes/Glucose Control    • Glucose maintained within prescribed range Adequate for Discharge          CARDIOVASCULAR - ADULT    • Maintains optimal cardiac output and hemodynamic stability Progressing    • Absence of cardiac arrhythmias or at baseline P

## 2017-08-24 NOTE — PROGRESS NOTES
Met with patient and wife to discuss discharge instructions, activity restrictions and recommendations, follow up visits, all questions answered, encouraged to call with any questions or concerns.    Contreras English RN  Clinical Coordinator  CV Surgery

## 2017-08-28 ENCOUNTER — TELEPHONE (OUTPATIENT)
Dept: CARDIOLOGY UNIT | Facility: HOSPITAL | Age: 77
End: 2017-08-28

## 2017-08-28 NOTE — PROGRESS NOTES
Discussed bp and drowsiness with wife and patient. He has been asymptomatic aside from the drowsiness. Held bp meds at the advice of the [place] RN, bp today 106/70. Suggested he not take valium and ambien together tonight.   Will follow up tomorrow after cardi

## 2017-08-28 NOTE — PROGRESS NOTES
Follow Up Phone Call    1. How are you doing now that you are home? Spoke with both pt and wife Aren on speaker phone. They have a few concerns.  First blood pressure has been low (86/64, 90/58) pt asymp

## 2017-08-29 ENCOUNTER — PRIOR ORIGINAL RECORDS (OUTPATIENT)
Dept: OTHER | Age: 77
End: 2017-08-29

## 2017-08-29 ENCOUNTER — HOSPITAL ENCOUNTER (OUTPATIENT)
Dept: GENERAL RADIOLOGY | Facility: HOSPITAL | Age: 77
Discharge: HOME | End: 2017-08-29
Attending: THORACIC SURGERY (CARDIOTHORACIC VASCULAR SURGERY)
Payer: MEDICARE

## 2017-08-29 ENCOUNTER — APPOINTMENT (OUTPATIENT)
Dept: LAB | Facility: HOSPITAL | Age: 77
End: 2017-08-29
Attending: THORACIC SURGERY (CARDIOTHORACIC VASCULAR SURGERY)
Payer: MEDICARE

## 2017-08-29 ENCOUNTER — TELEPHONE (OUTPATIENT)
Dept: CARDIOLOGY UNIT | Facility: HOSPITAL | Age: 77
End: 2017-08-29

## 2017-08-29 DIAGNOSIS — I35.0 AORTIC VALVE STENOSIS, ETIOLOGY OF CARDIAC VALVE DISEASE UNSPECIFIED: ICD-10-CM

## 2017-08-29 DIAGNOSIS — Z95.2 S/P AVR: ICD-10-CM

## 2017-08-29 DIAGNOSIS — Z98.890 HISTORY OF OPEN HEART SURGERY: ICD-10-CM

## 2017-08-29 LAB
BUN BLD-MCNC: 16 MG/DL (ref 8–20)
CALCIUM BLD-MCNC: 8.5 MG/DL (ref 8.3–10.3)
CHLORIDE: 103 MMOL/L (ref 101–111)
CO2: 26 MMOL/L (ref 22–32)
CREAT BLD-MCNC: 1.05 MG/DL (ref 0.7–1.3)
GLUCOSE BLD-MCNC: 119 MG/DL (ref 70–99)
POTASSIUM SERPL-SCNC: 4.5 MMOL/L (ref 3.6–5.1)
SODIUM SERPL-SCNC: 137 MMOL/L (ref 136–144)

## 2017-08-29 PROCEDURE — 71035 XR CHEST DECUBITUS (CPT=71035): CPT | Performed by: THORACIC SURGERY (CARDIOTHORACIC VASCULAR SURGERY)

## 2017-08-29 PROCEDURE — 36415 COLL VENOUS BLD VENIPUNCTURE: CPT

## 2017-08-29 PROCEDURE — 71020 XR CHEST PA + LAT CHEST (CPT=71020): CPT | Performed by: THORACIC SURGERY (CARDIOTHORACIC VASCULAR SURGERY)

## 2017-08-29 PROCEDURE — 80048 BASIC METABOLIC PNL TOTAL CA: CPT

## 2017-08-29 NOTE — PROGRESS NOTES
CXR reviewed, d/w EDISON. Neo Zamorano, cardiology increased lasix and holding bp meds, will repeat cxr next week. Instructed to call with increased sob, cough, respiratory symptoms. They agree and understand.   Renuka De La Cruz RN  Clinical Coordinator  CV Surgery

## 2017-09-01 ENCOUNTER — LAB ENCOUNTER (OUTPATIENT)
Dept: LAB | Facility: HOSPITAL | Age: 77
End: 2017-09-01
Attending: CLINICAL NURSE SPECIALIST
Payer: MEDICARE

## 2017-09-01 ENCOUNTER — HOSPITAL ENCOUNTER (OUTPATIENT)
Dept: GENERAL RADIOLOGY | Facility: HOSPITAL | Age: 77
Discharge: HOME | End: 2017-09-01
Attending: THORACIC SURGERY (CARDIOTHORACIC VASCULAR SURGERY)
Payer: MEDICARE

## 2017-09-01 ENCOUNTER — PRIOR ORIGINAL RECORDS (OUTPATIENT)
Dept: OTHER | Age: 77
End: 2017-09-01

## 2017-09-01 DIAGNOSIS — Z98.890 HISTORY OF OPEN HEART SURGERY: ICD-10-CM

## 2017-09-01 DIAGNOSIS — R06.03 ACUTE RESPIRATORY DISTRESS: Primary | ICD-10-CM

## 2017-09-01 DIAGNOSIS — R53.83 FATIGUE: ICD-10-CM

## 2017-09-01 LAB
BUN BLD-MCNC: 20 MG/DL (ref 8–20)
CALCIUM BLD-MCNC: 8.8 MG/DL (ref 8.3–10.3)
CHLORIDE: 103 MMOL/L (ref 101–111)
CO2: 27 MMOL/L (ref 22–32)
CREAT BLD-MCNC: 1.13 MG/DL (ref 0.7–1.3)
GLUCOSE BLD-MCNC: 142 MG/DL (ref 70–99)
POTASSIUM SERPL-SCNC: 4.3 MMOL/L (ref 3.6–5.1)
SODIUM SERPL-SCNC: 137 MMOL/L (ref 136–144)

## 2017-09-01 PROCEDURE — 71035 XR CHEST DECUBITUS (CPT=71035): CPT | Performed by: THORACIC SURGERY (CARDIOTHORACIC VASCULAR SURGERY)

## 2017-09-01 PROCEDURE — 80048 BASIC METABOLIC PNL TOTAL CA: CPT

## 2017-09-01 PROCEDURE — 71020 XR CHEST PA + LAT CHEST (CPT=71020): CPT | Performed by: THORACIC SURGERY (CARDIOTHORACIC VASCULAR SURGERY)

## 2017-09-01 PROCEDURE — 36415 COLL VENOUS BLD VENIPUNCTURE: CPT

## 2017-09-05 ENCOUNTER — TELEPHONE (OUTPATIENT)
Dept: CARDIOLOGY UNIT | Facility: HOSPITAL | Age: 77
End: 2017-09-05

## 2017-09-05 ENCOUNTER — PRIOR ORIGINAL RECORDS (OUTPATIENT)
Dept: OTHER | Age: 77
End: 2017-09-05

## 2017-09-05 LAB
BUN: 20 MG/DL
CALCIUM: 8.8 MG/DL
CHLORIDE: 103 MEQ/L
CREATININE, SERUM: 1.13 MG/DL
GLUCOSE: 142 MG/DL
POTASSIUM, SERUM: 4.3 MEQ/L
SODIUM: 137 MEQ/L

## 2017-09-06 NOTE — DISCHARGE SUMMARY
[facility]  Discharge Summary    Gene Hayden Patient Status:  Inpatient    1940 MRN [id number]   [facility] 8NE-A Attending No att. providers found   Hosp Day # 7 PCP Harvinder Nolan MD     Admit date: 2017    Discharge

## 2017-09-18 ENCOUNTER — LAB ENCOUNTER (OUTPATIENT)
Dept: LAB | Facility: HOSPITAL | Age: 77
End: 2017-09-18
Attending: INTERNAL MEDICINE
Payer: MEDICARE

## 2017-09-18 DIAGNOSIS — I50.23 ACUTE ON CHRONIC SYSTOLIC HEART FAILURE (HCC): Primary | ICD-10-CM

## 2017-09-18 DIAGNOSIS — R00.0 SINUS TACHYCARDIA: ICD-10-CM

## 2017-09-18 LAB
BUN BLD-MCNC: 13 MG/DL (ref 8–20)
CALCIUM BLD-MCNC: 8.7 MG/DL (ref 8.3–10.3)
CHLORIDE: 106 MMOL/L (ref 101–111)
CO2: 26 MMOL/L (ref 22–32)
CREAT BLD-MCNC: 0.8 MG/DL (ref 0.7–1.3)
GLUCOSE BLD-MCNC: 98 MG/DL (ref 70–99)
POTASSIUM SERPL-SCNC: 4.2 MMOL/L (ref 3.6–5.1)
SODIUM SERPL-SCNC: 139 MMOL/L (ref 136–144)

## 2017-09-18 PROCEDURE — 80048 BASIC METABOLIC PNL TOTAL CA: CPT

## 2017-09-18 PROCEDURE — 36415 COLL VENOUS BLD VENIPUNCTURE: CPT

## 2017-09-19 ENCOUNTER — PRIOR ORIGINAL RECORDS (OUTPATIENT)
Dept: OTHER | Age: 77
End: 2017-09-19

## 2017-09-19 ENCOUNTER — MYAURORA ACCOUNT LINK (OUTPATIENT)
Dept: OTHER | Age: 77
End: 2017-09-19

## 2017-09-19 LAB
BUN: 13 MG/DL
CHLORIDE: 106 MEQ/L
CREATININE, SERUM: 0.8 MG/DL
GLUCOSE: 98 MG/DL
POTASSIUM, SERUM: 4.2 MEQ/L
SODIUM: 139 MEQ/L

## 2017-09-27 ENCOUNTER — HOSPITAL ENCOUNTER (OUTPATIENT)
Dept: CV DIAGNOSTICS | Facility: HOSPITAL | Age: 77
Discharge: HOME | End: 2017-09-27
Attending: INTERNAL MEDICINE
Payer: MEDICARE

## 2017-09-27 DIAGNOSIS — R00.2 PALPITATION: ICD-10-CM

## 2017-09-27 PROCEDURE — 93017 CV STRESS TEST TRACING ONLY: CPT | Performed by: INTERNAL MEDICINE

## 2017-09-27 PROCEDURE — 93018 CV STRESS TEST I&R ONLY: CPT | Performed by: INTERNAL MEDICINE

## 2017-09-29 ENCOUNTER — PRIOR ORIGINAL RECORDS (OUTPATIENT)
Dept: OTHER | Age: 77
End: 2017-09-29

## 2017-10-04 ENCOUNTER — PRIOR ORIGINAL RECORDS (OUTPATIENT)
Dept: OTHER | Age: 77
End: 2017-10-04

## 2017-10-05 ENCOUNTER — CARDPULM VISIT (OUTPATIENT)
Dept: CARDIAC REHAB | Facility: HOSPITAL | Age: 77
End: 2017-10-05
Attending: INTERNAL MEDICINE
Payer: MEDICARE

## 2017-10-05 VITALS — HEIGHT: 68 IN | BODY MASS INDEX: 21.52 KG/M2 | WEIGHT: 142 LBS

## 2017-10-05 PROCEDURE — 93798 PHYS/QHP OP CAR RHAB W/ECG: CPT

## 2017-10-05 RX ORDER — PYRIDOXINE HCL (VITAMIN B6) 100 MG
TABLET ORAL DAILY
COMMUNITY

## 2017-10-05 RX ORDER — MULTIVIT-MIN/IRON FUM/FOLIC AC 7.5 MG-4
1 TABLET ORAL DAILY
COMMUNITY

## 2017-10-09 ENCOUNTER — CARDPULM VISIT (OUTPATIENT)
Dept: CARDIAC REHAB | Facility: HOSPITAL | Age: 77
End: 2017-10-09
Attending: INTERNAL MEDICINE
Payer: MEDICARE

## 2017-10-09 PROCEDURE — 93798 PHYS/QHP OP CAR RHAB W/ECG: CPT

## 2017-10-11 ENCOUNTER — CARDPULM VISIT (OUTPATIENT)
Dept: CARDIAC REHAB | Facility: HOSPITAL | Age: 77
End: 2017-10-11
Attending: INTERNAL MEDICINE
Payer: MEDICARE

## 2017-10-11 PROCEDURE — 93798 PHYS/QHP OP CAR RHAB W/ECG: CPT

## 2017-10-13 ENCOUNTER — APPOINTMENT (OUTPATIENT)
Dept: CARDIAC REHAB | Facility: HOSPITAL | Age: 77
End: 2017-10-13
Attending: INTERNAL MEDICINE
Payer: MEDICARE

## 2017-10-13 PROCEDURE — 93798 PHYS/QHP OP CAR RHAB W/ECG: CPT

## 2017-10-16 ENCOUNTER — APPOINTMENT (OUTPATIENT)
Dept: CARDIAC REHAB | Facility: HOSPITAL | Age: 77
End: 2017-10-16
Attending: INTERNAL MEDICINE
Payer: MEDICARE

## 2017-10-16 PROCEDURE — 93798 PHYS/QHP OP CAR RHAB W/ECG: CPT

## 2017-10-18 ENCOUNTER — APPOINTMENT (OUTPATIENT)
Dept: CARDIAC REHAB | Facility: HOSPITAL | Age: 77
End: 2017-10-18
Attending: INTERNAL MEDICINE
Payer: MEDICARE

## 2017-10-18 PROCEDURE — 93798 PHYS/QHP OP CAR RHAB W/ECG: CPT

## 2017-10-20 ENCOUNTER — CARDPULM VISIT (OUTPATIENT)
Dept: CARDIAC REHAB | Facility: HOSPITAL | Age: 77
End: 2017-10-20
Attending: INTERNAL MEDICINE
Payer: MEDICARE

## 2017-10-20 PROCEDURE — 93798 PHYS/QHP OP CAR RHAB W/ECG: CPT

## 2017-10-23 ENCOUNTER — APPOINTMENT (OUTPATIENT)
Dept: CARDIAC REHAB | Facility: HOSPITAL | Age: 77
End: 2017-10-23
Attending: INTERNAL MEDICINE
Payer: MEDICARE

## 2017-10-23 PROCEDURE — 93798 PHYS/QHP OP CAR RHAB W/ECG: CPT

## 2017-10-25 ENCOUNTER — CARDPULM VISIT (OUTPATIENT)
Dept: CARDIAC REHAB | Facility: HOSPITAL | Age: 77
End: 2017-10-25
Attending: INTERNAL MEDICINE
Payer: MEDICARE

## 2017-10-25 PROCEDURE — 93798 PHYS/QHP OP CAR RHAB W/ECG: CPT

## 2017-10-27 ENCOUNTER — APPOINTMENT (OUTPATIENT)
Dept: CARDIAC REHAB | Facility: HOSPITAL | Age: 77
End: 2017-10-27
Attending: INTERNAL MEDICINE
Payer: MEDICARE

## 2017-10-27 PROCEDURE — 93798 PHYS/QHP OP CAR RHAB W/ECG: CPT

## 2017-10-30 ENCOUNTER — CARDPULM VISIT (OUTPATIENT)
Dept: CARDIAC REHAB | Facility: HOSPITAL | Age: 77
End: 2017-10-30
Attending: INTERNAL MEDICINE
Payer: MEDICARE

## 2017-10-30 PROCEDURE — 93798 PHYS/QHP OP CAR RHAB W/ECG: CPT

## 2017-11-01 ENCOUNTER — APPOINTMENT (OUTPATIENT)
Dept: CARDIAC REHAB | Facility: HOSPITAL | Age: 77
End: 2017-11-01
Attending: INTERNAL MEDICINE
Payer: MEDICARE

## 2017-11-03 ENCOUNTER — APPOINTMENT (OUTPATIENT)
Dept: CARDIAC REHAB | Facility: HOSPITAL | Age: 77
End: 2017-11-03
Attending: INTERNAL MEDICINE
Payer: MEDICARE

## 2017-11-06 ENCOUNTER — APPOINTMENT (OUTPATIENT)
Dept: CARDIAC REHAB | Facility: HOSPITAL | Age: 77
End: 2017-11-06
Attending: INTERNAL MEDICINE
Payer: MEDICARE

## 2017-11-08 ENCOUNTER — APPOINTMENT (OUTPATIENT)
Dept: CARDIAC REHAB | Facility: HOSPITAL | Age: 77
End: 2017-11-08
Attending: INTERNAL MEDICINE
Payer: MEDICARE

## 2017-11-10 ENCOUNTER — APPOINTMENT (OUTPATIENT)
Dept: CARDIAC REHAB | Facility: HOSPITAL | Age: 77
End: 2017-11-10
Attending: INTERNAL MEDICINE
Payer: MEDICARE

## 2017-11-16 ENCOUNTER — HOSPITAL ENCOUNTER (OUTPATIENT)
Dept: CV DIAGNOSTICS | Facility: HOSPITAL | Age: 77
Discharge: HOME | End: 2017-11-16
Attending: INTERNAL MEDICINE

## 2017-11-16 DIAGNOSIS — I71.2 THORACIC AORTIC ANEURYSM WITHOUT RUPTURE (HCC): ICD-10-CM

## 2017-11-16 DIAGNOSIS — I35.0 AORTIC VALVE STENOSIS, ETIOLOGY OF CARDIAC VALVE DISEASE UNSPECIFIED: ICD-10-CM

## 2017-11-17 ENCOUNTER — PRIOR ORIGINAL RECORDS (OUTPATIENT)
Dept: OTHER | Age: 77
End: 2017-11-17

## 2017-11-29 ENCOUNTER — PRIOR ORIGINAL RECORDS (OUTPATIENT)
Dept: OTHER | Age: 77
End: 2017-11-29

## 2018-01-26 ENCOUNTER — PRIOR ORIGINAL RECORDS (OUTPATIENT)
Dept: OTHER | Age: 78
End: 2018-01-26

## 2018-03-13 ENCOUNTER — ANESTHESIA EVENT (OUTPATIENT)
Dept: ENDOSCOPY | Facility: HOSPITAL | Age: 78
End: 2018-03-13
Payer: MEDICARE

## 2018-04-02 ENCOUNTER — ANESTHESIA (OUTPATIENT)
Dept: ENDOSCOPY | Facility: HOSPITAL | Age: 78
End: 2018-04-02
Payer: MEDICARE

## 2018-04-02 ENCOUNTER — HOSPITAL ENCOUNTER (OUTPATIENT)
Facility: HOSPITAL | Age: 78
Setting detail: HOSPITAL OUTPATIENT SURGERY
Discharge: HOME | End: 2018-04-02
Attending: INTERNAL MEDICINE | Admitting: INTERNAL MEDICINE
Payer: MEDICARE

## 2018-04-02 ENCOUNTER — SURGERY (OUTPATIENT)
Age: 78
End: 2018-04-02

## 2018-04-02 VITALS
TEMPERATURE: 98 F | HEART RATE: 45 BPM | DIASTOLIC BLOOD PRESSURE: 78 MMHG | SYSTOLIC BLOOD PRESSURE: 136 MMHG | RESPIRATION RATE: 16 BRPM | OXYGEN SATURATION: 100 % | BODY MASS INDEX: 22.73 KG/M2 | HEIGHT: 68 IN | WEIGHT: 150 LBS

## 2018-04-02 DIAGNOSIS — K31.89 SUBMUCOSAL LESION OF STOMACH: ICD-10-CM

## 2018-04-02 PROCEDURE — 0DJ08ZZ INSPECTION OF UPPER INTESTINAL TRACT, VIA NATURAL OR ARTIFICIAL OPENING ENDOSCOPIC: ICD-10-PCS | Performed by: INTERNAL MEDICINE

## 2018-04-02 RX ORDER — SODIUM CHLORIDE, SODIUM LACTATE, POTASSIUM CHLORIDE, CALCIUM CHLORIDE 600; 310; 30; 20 MG/100ML; MG/100ML; MG/100ML; MG/100ML
INJECTION, SOLUTION INTRAVENOUS CONTINUOUS
Status: DISCONTINUED | OUTPATIENT
Start: 2018-04-02 | End: 2018-04-02

## 2018-04-02 RX ORDER — NALOXONE HYDROCHLORIDE 0.4 MG/ML
80 INJECTION, SOLUTION INTRAMUSCULAR; INTRAVENOUS; SUBCUTANEOUS AS NEEDED
Status: DISCONTINUED | OUTPATIENT
Start: 2018-04-02 | End: 2018-04-02

## 2018-04-02 NOTE — ANESTHESIA PREPROCEDURE EVALUATION
PRE-OP EVALUATION    Patient Name: Cassius Swartz    Pre-op Diagnosis: Submucosal lesion of stomach [K31.89]    Procedure(s):  ESOPHAGOGASTRODUODENOSCOPY/ENDOSCOPIC ULTRASOUND WITH FINE NEEDLE ASPIRATION      Surgeon(s) and Role:     Melba Oglesby MD - (FLOMAX) 0.4 MG Oral Cap Take 0.8 mg by mouth nightly. Disp:  Rfl:        Allergies: Pcn [Penicillins]      Anesthesia Evaluation    Patient summary reviewed.     Anesthetic Complications  (+) history of anesthetic complications  History of: difficult air HISTORY      Comment: carpal tunnel  No date: SPINE SURGERY PROCEDURE UNLISTED      Comment: hardware-c spine, x2 L spine surgeries  No date: TONSILLECTOMY  7/20/16: UPPER GI ENDOSCOPY,DIAGNOSIS      Comment: antral erosions (bx negative for H. plyori),

## 2018-04-02 NOTE — OPERATIVE REPORT
OPERATIVE REPORT   PATIENT NAME: Estuardo Mitchell  MRN: [id number]  DATE OF OPERATION: 4/2/2018  PREOPERATIVE DIAGNOSIS:   1. Submucosal lesion in the stomach found on upper endoscopy in the past and subsequently had endoscopic ultrasound by myself in July 2016 There were no endoscopic features of eosinophilic esophagitis or Grimes's esophagus. Small sliding hiatal hernia seen  2. Normal stomach throughout with no evidence of ulcers, masses or other abnormalities.  Retroflexion revealed a normal cardia and fundu

## 2018-04-02 NOTE — ANESTHESIA POSTPROCEDURE EVALUATION
[id number] [facility] Patient Status:  Hospital Outpatient Surgery   Age/Gender 69year old male MRN [id number]   Location [facility]. Attending Sujata Hansen MD   Hosp Day # 0 PCP Breana Mccracken MD       Anesthesia Post-op N

## 2018-04-26 ENCOUNTER — MYAURORA ACCOUNT LINK (OUTPATIENT)
Dept: OTHER | Age: 78
End: 2018-04-26

## 2018-04-26 ENCOUNTER — HOSPITAL ENCOUNTER (OUTPATIENT)
Dept: CV DIAGNOSTICS | Facility: HOSPITAL | Age: 78
Discharge: HOME | End: 2018-04-26
Attending: INTERNAL MEDICINE

## 2018-04-26 ENCOUNTER — HOSPITAL ENCOUNTER (OUTPATIENT)
Dept: LAB | Facility: HOSPITAL | Age: 78
Discharge: HOME | End: 2018-04-26
Attending: INTERNAL MEDICINE
Payer: MEDICARE

## 2018-04-26 ENCOUNTER — PRIOR ORIGINAL RECORDS (OUTPATIENT)
Dept: OTHER | Age: 78
End: 2018-04-26

## 2018-04-26 DIAGNOSIS — I50.23 HEART FAILURE, SYSTOLIC, ACUTE ON CHRONIC (HCC): ICD-10-CM

## 2018-04-26 DIAGNOSIS — Z95.2 HISTORY OF MITRAL VALVE REPLACEMENT: ICD-10-CM

## 2018-04-26 PROCEDURE — 80053 COMPREHEN METABOLIC PANEL: CPT | Performed by: INTERNAL MEDICINE

## 2018-04-26 PROCEDURE — 80061 LIPID PANEL: CPT | Performed by: INTERNAL MEDICINE

## 2018-04-26 PROCEDURE — 36415 COLL VENOUS BLD VENIPUNCTURE: CPT | Performed by: INTERNAL MEDICINE

## 2018-04-30 LAB
ALBUMIN: 4.1 G/DL
ALKALINE PHOSPHATATE(ALK PHOS): 55 IU/L
BILIRUBIN TOTAL: 0.5 MG/DL
BUN: 21 MG/DL
CALCIUM: 9.4 MG/DL
CHLORIDE: 108 MEQ/L
CHOLESTEROL, TOTAL: 161 MG/DL
CREATININE, SERUM: 1 MG/DL
GLUCOSE: 106 MG/DL
HDL CHOLESTEROL: 42 MG/DL
LDL CHOLESTEROL: 104 MG/DL
POTASSIUM, SERUM: 4.3 MEQ/L
PROTEIN, TOTAL: 6.9 G/DL
SGOT (AST): 15 IU/L
SGPT (ALT): 18 IU/L
SODIUM: 142 MEQ/L
TRIGLYCERIDES: 75 MG/DL

## 2018-05-07 NOTE — PROGRESS NOTES
Reviewed cxr with Tasha Yates, cxr improved, pt denies worsening sob, cough, orthopnea. Pt remains on lasix, no follow up cxr advised unless symptoms arise. Instructed to call with changes in respiratory status. They understand.   Contreras English RN  [facility] oral

## 2018-05-30 ENCOUNTER — PRIOR ORIGINAL RECORDS (OUTPATIENT)
Dept: OTHER | Age: 78
End: 2018-05-30

## 2018-09-21 ENCOUNTER — PRIOR ORIGINAL RECORDS (OUTPATIENT)
Dept: OTHER | Age: 78
End: 2018-09-21

## 2018-09-24 LAB
ALBUMIN: 4.4 G/DL
ALKALINE PHOSPHATATE(ALK PHOS): 38 IU/L
BILIRUBIN TOTAL: 0.3 MG/DL
BUN: 16 MG/DL
CALCIUM: 8.7 MG/DL
CHLORIDE: 104 MEQ/L
CHOLESTEROL, TOTAL: 163 MG/DL
CREATININE, SERUM: 0.9 MG/DL
GLUCOSE: 92 MG/DL
HDL CHOLESTEROL: 46 MG/DL
LDL CHOLESTEROL: 104 MG/DL
NON-HDL CHOLESTEROL: 117 MG/DL
POTASSIUM, SERUM: 4.7 MEQ/L
PROTEIN, TOTAL: 6.2 G/DL
SGOT (AST): 13 IU/L
SGPT (ALT): 10 IU/L
SODIUM: 141 MEQ/L
TOTAL CHOLESTEROL / HDL RATIO: 3.5 RATIO UN
TRIGLYCERIDES: 65 MG/DL

## 2018-09-26 ENCOUNTER — PRIOR ORIGINAL RECORDS (OUTPATIENT)
Dept: OTHER | Age: 78
End: 2018-09-26

## 2018-09-26 ENCOUNTER — MYAURORA ACCOUNT LINK (OUTPATIENT)
Dept: OTHER | Age: 78
End: 2018-09-26

## 2019-02-28 VITALS
DIASTOLIC BLOOD PRESSURE: 60 MMHG | HEART RATE: 86 BPM | BODY MASS INDEX: 21.22 KG/M2 | SYSTOLIC BLOOD PRESSURE: 106 MMHG | WEIGHT: 140 LBS | HEIGHT: 68 IN

## 2019-02-28 VITALS
HEART RATE: 70 BPM | SYSTOLIC BLOOD PRESSURE: 102 MMHG | WEIGHT: 148 LBS | HEIGHT: 68 IN | BODY MASS INDEX: 22.43 KG/M2 | DIASTOLIC BLOOD PRESSURE: 78 MMHG

## 2019-02-28 VITALS
WEIGHT: 153 LBS | HEART RATE: 56 BPM | SYSTOLIC BLOOD PRESSURE: 94 MMHG | DIASTOLIC BLOOD PRESSURE: 64 MMHG | BODY MASS INDEX: 23.19 KG/M2 | HEIGHT: 68 IN

## 2019-02-28 VITALS
HEIGHT: 68 IN | DIASTOLIC BLOOD PRESSURE: 60 MMHG | WEIGHT: 144 LBS | SYSTOLIC BLOOD PRESSURE: 90 MMHG | BODY MASS INDEX: 21.82 KG/M2 | HEART RATE: 86 BPM

## 2019-02-28 VITALS
BODY MASS INDEX: 21.98 KG/M2 | SYSTOLIC BLOOD PRESSURE: 98 MMHG | DIASTOLIC BLOOD PRESSURE: 46 MMHG | HEIGHT: 68 IN | HEART RATE: 76 BPM | WEIGHT: 145 LBS

## 2019-02-28 VITALS
HEART RATE: 66 BPM | WEIGHT: 143 LBS | BODY MASS INDEX: 21.67 KG/M2 | DIASTOLIC BLOOD PRESSURE: 64 MMHG | SYSTOLIC BLOOD PRESSURE: 96 MMHG | HEIGHT: 68 IN

## 2019-02-28 VITALS
WEIGHT: 162 LBS | BODY MASS INDEX: 24.55 KG/M2 | HEART RATE: 52 BPM | HEIGHT: 68 IN | SYSTOLIC BLOOD PRESSURE: 138 MMHG | DIASTOLIC BLOOD PRESSURE: 84 MMHG

## 2019-02-28 VITALS
WEIGHT: 148 LBS | BODY MASS INDEX: 22.43 KG/M2 | SYSTOLIC BLOOD PRESSURE: 104 MMHG | DIASTOLIC BLOOD PRESSURE: 76 MMHG | HEART RATE: 92 BPM | HEIGHT: 68 IN

## 2019-02-28 VITALS
HEART RATE: 60 BPM | HEIGHT: 68 IN | WEIGHT: 145 LBS | DIASTOLIC BLOOD PRESSURE: 60 MMHG | SYSTOLIC BLOOD PRESSURE: 88 MMHG | BODY MASS INDEX: 21.98 KG/M2

## 2019-03-22 RX ORDER — SPIRONOLACTONE 25 MG/1
TABLET ORAL
COMMUNITY
Start: 2018-09-26 | End: 2019-05-01 | Stop reason: SDUPTHER

## 2019-03-22 RX ORDER — BACLOFEN 20 MG/1
TABLET ORAL
COMMUNITY

## 2019-03-22 RX ORDER — ASPIRIN 81 MG
TABLET, DELAYED RELEASE (ENTERIC COATED) ORAL
COMMUNITY
End: 2019-09-04 | Stop reason: ALTCHOICE

## 2019-03-22 RX ORDER — AMIODARONE HYDROCHLORIDE 200 MG/1
TABLET ORAL
COMMUNITY
Start: 2018-09-26 | End: 2019-05-01 | Stop reason: SDUPTHER

## 2019-03-22 RX ORDER — ZOLPIDEM TARTRATE 10 MG/1
TABLET ORAL
COMMUNITY
Start: 2014-03-06

## 2019-03-22 RX ORDER — DIGOXIN 125 MCG
TABLET ORAL
COMMUNITY
Start: 2018-09-26 | End: 2019-05-01 | Stop reason: SDUPTHER

## 2019-03-22 RX ORDER — CARVEDILOL 3.12 MG/1
TABLET ORAL
COMMUNITY
End: 2019-05-01 | Stop reason: DRUGHIGH

## 2019-03-22 RX ORDER — FUROSEMIDE 20 MG/1
TABLET ORAL
COMMUNITY
Start: 2018-06-05 | End: 2019-05-01 | Stop reason: DRUGHIGH

## 2019-03-22 RX ORDER — LOSARTAN POTASSIUM 25 MG/1
TABLET ORAL
COMMUNITY
Start: 2018-09-26 | End: 2019-05-01 | Stop reason: SDUPTHER

## 2019-03-22 RX ORDER — ASPIRIN 325 MG
TABLET ORAL
COMMUNITY
Start: 2014-03-06 | End: 2019-12-04 | Stop reason: DRUGHIGH

## 2019-04-30 PROBLEM — I48.0 PAROXYSMAL ATRIAL FIBRILLATION (CMD): Status: ACTIVE | Noted: 2017-08-29

## 2019-04-30 PROBLEM — I35.0 AORTIC STENOSIS: Status: ACTIVE | Noted: 2017-08-29

## 2019-04-30 PROBLEM — Z95.2 HISTORY OF AORTIC VALVE REPLACEMENT: Status: ACTIVE | Noted: 2017-08-29

## 2019-05-01 ENCOUNTER — OFFICE VISIT (OUTPATIENT)
Dept: CARDIOLOGY | Age: 79
End: 2019-05-01

## 2019-05-01 VITALS
SYSTOLIC BLOOD PRESSURE: 100 MMHG | WEIGHT: 159 LBS | DIASTOLIC BLOOD PRESSURE: 62 MMHG | HEART RATE: 72 BPM | HEIGHT: 68 IN | BODY MASS INDEX: 24.1 KG/M2

## 2019-05-01 DIAGNOSIS — Z95.2 HISTORY OF AORTIC VALVE REPLACEMENT: ICD-10-CM

## 2019-05-01 DIAGNOSIS — I71.21 THORACIC ASCENDING AORTIC ANEURYSM (CMD): Primary | ICD-10-CM

## 2019-05-01 DIAGNOSIS — I48.0 PAROXYSMAL ATRIAL FIBRILLATION (CMD): ICD-10-CM

## 2019-05-01 DIAGNOSIS — I35.0 AORTIC VALVE STENOSIS, ETIOLOGY OF CARDIAC VALVE DISEASE UNSPECIFIED: ICD-10-CM

## 2019-05-01 DIAGNOSIS — Q23.1 BICUSPID AORTIC VALVE: ICD-10-CM

## 2019-05-01 DIAGNOSIS — I10 HYPERTENSION, BENIGN: ICD-10-CM

## 2019-05-01 PROCEDURE — 99214 OFFICE O/P EST MOD 30 MIN: CPT | Performed by: INTERNAL MEDICINE

## 2019-05-01 RX ORDER — CARVEDILOL 6.25 MG/1
6.25 TABLET ORAL 2 TIMES DAILY WITH MEALS
Qty: 180 TABLET | Refills: 3 | Status: SHIPPED | OUTPATIENT
Start: 2019-05-01 | End: 2019-12-05 | Stop reason: SDUPTHER

## 2019-05-01 RX ORDER — DIGOXIN 125 MCG
125 TABLET ORAL DAILY
Qty: 90 TABLET | Refills: 3 | Status: SHIPPED | OUTPATIENT
Start: 2019-05-01 | End: 2019-12-05 | Stop reason: SDUPTHER

## 2019-05-01 RX ORDER — LOSARTAN POTASSIUM 25 MG/1
25 TABLET ORAL DAILY
Qty: 90 TABLET | Refills: 3 | Status: SHIPPED | OUTPATIENT
Start: 2019-05-01 | End: 2019-12-05 | Stop reason: SDUPTHER

## 2019-05-01 RX ORDER — CARVEDILOL 6.25 MG/1
6.25 TABLET ORAL 2 TIMES DAILY WITH MEALS
COMMUNITY
End: 2019-05-01 | Stop reason: SDUPTHER

## 2019-05-01 RX ORDER — SPIRONOLACTONE 25 MG/1
25 TABLET ORAL DAILY
Qty: 90 TABLET | Refills: 3 | Status: SHIPPED | OUTPATIENT
Start: 2019-05-01 | End: 2019-12-05 | Stop reason: SDUPTHER

## 2019-05-01 RX ORDER — AMIODARONE HYDROCHLORIDE 200 MG/1
200 TABLET ORAL DAILY
Qty: 90 TABLET | Refills: 2 | Status: SHIPPED | OUTPATIENT
Start: 2019-05-01 | End: 2019-08-29 | Stop reason: ALTCHOICE

## 2019-05-01 SDOH — HEALTH STABILITY: PHYSICAL HEALTH: ON AVERAGE, HOW MANY DAYS PER WEEK DO YOU ENGAGE IN MODERATE TO STRENUOUS EXERCISE (LIKE A BRISK WALK)?: 7 DAYS

## 2019-05-01 SDOH — HEALTH STABILITY: PHYSICAL HEALTH: ON AVERAGE, HOW MANY MINUTES DO YOU ENGAGE IN EXERCISE AT THIS LEVEL?: 40 MIN

## 2019-05-01 ASSESSMENT — ENCOUNTER SYMPTOMS
BRUISES/BLEEDS EASILY: 0
WEIGHT LOSS: 0
FEVER: 0
BACK PAIN: 1
ALLERGIC/IMMUNOLOGIC COMMENTS: NO NEW FOOD ALLERGIES
SUSPICIOUS LESIONS: 0
HEMOPTYSIS: 0
HEMATOCHEZIA: 0
COUGH: 0
WEIGHT GAIN: 0
CHILLS: 0

## 2019-05-10 ENCOUNTER — HOSPITAL ENCOUNTER (OUTPATIENT)
Dept: CV DIAGNOSTICS | Facility: HOSPITAL | Age: 79
Discharge: HOME | End: 2019-05-10
Attending: INTERNAL MEDICINE
Payer: MEDICARE

## 2019-05-10 DIAGNOSIS — I48.0 AF (PAROXYSMAL ATRIAL FIBRILLATION) (HCC): ICD-10-CM

## 2019-05-10 DIAGNOSIS — Z95.2 AORTIC VALVE REPLACED: ICD-10-CM

## 2019-05-10 DIAGNOSIS — I71.2 THORACIC AORTIC ANEURYSM WITHOUT RUPTURE (HCC): ICD-10-CM

## 2019-05-10 DIAGNOSIS — I10 BENIGN HYPERTENSION: ICD-10-CM

## 2019-05-10 DIAGNOSIS — Q23.1 BICUSPID AORTIC VALVE: ICD-10-CM

## 2019-05-10 PROCEDURE — 93306 TTE W/DOPPLER COMPLETE: CPT | Performed by: INTERNAL MEDICINE

## 2019-05-15 ENCOUNTER — TELEPHONE (OUTPATIENT)
Dept: CARDIOLOGY | Age: 79
End: 2019-05-15

## 2019-05-31 ENCOUNTER — TELEPHONE (OUTPATIENT)
Dept: CARDIOLOGY | Age: 79
End: 2019-05-31

## 2019-07-24 ENCOUNTER — TELEPHONE (OUTPATIENT)
Dept: CARDIOLOGY | Age: 79
End: 2019-07-24

## 2019-07-25 ENCOUNTER — TELEPHONE (OUTPATIENT)
Dept: CARDIOLOGY | Age: 79
End: 2019-07-25

## 2019-08-02 ENCOUNTER — TELEPHONE (OUTPATIENT)
Dept: CARDIOLOGY | Age: 79
End: 2019-08-02

## 2019-08-02 DIAGNOSIS — I48.0 PAROXYSMAL ATRIAL FIBRILLATION (CMD): ICD-10-CM

## 2019-08-02 DIAGNOSIS — Z92.29 HISTORY OF AMIODARONE THERAPY: Primary | ICD-10-CM

## 2019-08-29 ENCOUNTER — TELEPHONE (OUTPATIENT)
Dept: CARDIOLOGY | Age: 79
End: 2019-08-29

## 2019-08-30 RX ORDER — AMIODARONE HYDROCHLORIDE 200 MG/1
200 TABLET ORAL
COMMUNITY
Start: 2017-08-24 | End: 2019-09-04 | Stop reason: ALTCHOICE

## 2019-08-30 RX ORDER — FUROSEMIDE 20 MG/1
20 TABLET ORAL
COMMUNITY
Start: 2017-08-24 | End: 2019-09-04 | Stop reason: ALTCHOICE

## 2019-08-30 RX ORDER — ZOLPIDEM TARTRATE 10 MG/1
TABLET ORAL
COMMUNITY
Start: 2019-06-10 | End: 2019-09-04 | Stop reason: SDUPTHER

## 2019-08-30 RX ORDER — DIAZEPAM 5 MG/1
TABLET ORAL
Refills: 0 | COMMUNITY
Start: 2019-08-21

## 2019-08-30 RX ORDER — HYDROMORPHONE HYDROCHLORIDE 8 MG/1
8 TABLET ORAL
COMMUNITY
Start: 2017-08-24

## 2019-08-30 RX ORDER — MULTIVITAMIN,THERAPEUTIC
1 TABLET ORAL
COMMUNITY
Start: 2012-11-16

## 2019-08-30 RX ORDER — TAMSULOSIN HYDROCHLORIDE 0.4 MG/1
0.4 CAPSULE ORAL
COMMUNITY

## 2019-08-30 RX ORDER — PREGABALIN 75 MG/1
CAPSULE ORAL
COMMUNITY
Start: 2007-01-31 | End: 2019-12-03 | Stop reason: ALTCHOICE

## 2019-09-04 ENCOUNTER — OFFICE VISIT (OUTPATIENT)
Dept: CARDIOLOGY | Age: 79
End: 2019-09-04

## 2019-09-04 VITALS
WEIGHT: 155 LBS | SYSTOLIC BLOOD PRESSURE: 126 MMHG | HEIGHT: 68 IN | DIASTOLIC BLOOD PRESSURE: 54 MMHG | BODY MASS INDEX: 23.49 KG/M2 | HEART RATE: 54 BPM

## 2019-09-04 DIAGNOSIS — Q23.1 BICUSPID AORTIC VALVE: ICD-10-CM

## 2019-09-04 DIAGNOSIS — Z95.2 HISTORY OF AORTIC VALVE REPLACEMENT: ICD-10-CM

## 2019-09-04 DIAGNOSIS — I71.21 THORACIC ASCENDING AORTIC ANEURYSM (CMD): Primary | ICD-10-CM

## 2019-09-04 DIAGNOSIS — I35.0 AORTIC VALVE STENOSIS, ETIOLOGY OF CARDIAC VALVE DISEASE UNSPECIFIED: ICD-10-CM

## 2019-09-04 DIAGNOSIS — I48.0 PAROXYSMAL ATRIAL FIBRILLATION (CMD): ICD-10-CM

## 2019-09-04 DIAGNOSIS — I10 HYPERTENSION, BENIGN: ICD-10-CM

## 2019-09-04 PROCEDURE — 99214 OFFICE O/P EST MOD 30 MIN: CPT | Performed by: INTERNAL MEDICINE

## 2019-09-04 SDOH — HEALTH STABILITY: PHYSICAL HEALTH: ON AVERAGE, HOW MANY DAYS PER WEEK DO YOU ENGAGE IN MODERATE TO STRENUOUS EXERCISE (LIKE A BRISK WALK)?: 7 DAYS

## 2019-09-04 SDOH — HEALTH STABILITY: PHYSICAL HEALTH: ON AVERAGE, HOW MANY MINUTES DO YOU ENGAGE IN EXERCISE AT THIS LEVEL?: 40 MIN

## 2019-09-04 ASSESSMENT — ENCOUNTER SYMPTOMS
ALLERGIC/IMMUNOLOGIC COMMENTS: NO NEW FOOD ALLERGIES
CHILLS: 0
HEMOPTYSIS: 0
BRUISES/BLEEDS EASILY: 0
SUSPICIOUS LESIONS: 0
FEVER: 0
WEIGHT GAIN: 0
COUGH: 0
HEMATOCHEZIA: 0
WEIGHT LOSS: 0

## 2019-11-18 ENCOUNTER — HOSPITAL ENCOUNTER (OUTPATIENT)
Dept: LAB | Facility: HOSPITAL | Age: 79
Discharge: HOME | End: 2019-11-18
Attending: INTERNAL MEDICINE
Payer: MEDICARE

## 2019-11-18 ENCOUNTER — HOSPITAL ENCOUNTER (OUTPATIENT)
Dept: CV DIAGNOSTICS | Facility: HOSPITAL | Age: 79
Discharge: HOME | End: 2019-11-18
Attending: INTERNAL MEDICINE
Payer: MEDICARE

## 2019-11-18 DIAGNOSIS — Z95.2 STATUS POST AORTIC VALVE REPLACEMENT: ICD-10-CM

## 2019-11-18 DIAGNOSIS — I48.0 AF (PAROXYSMAL ATRIAL FIBRILLATION) (HCC): ICD-10-CM

## 2019-11-18 DIAGNOSIS — Q23.1 BICUSPID AORTIC VALVE: ICD-10-CM

## 2019-11-18 DIAGNOSIS — I71.2 THORACIC ASCENDING AORTIC ANEURYSM (HCC): ICD-10-CM

## 2019-11-18 DIAGNOSIS — I10 BENIGN HYPERTENSION: ICD-10-CM

## 2019-11-18 DIAGNOSIS — I35.0 AORTIC VALVE STENOSIS, ETIOLOGY OF CARDIAC VALVE DISEASE UNSPECIFIED: ICD-10-CM

## 2019-11-18 LAB
ALT SERPL-CCNC: 18 U/L
AST SERPL-CCNC: 15 U/L
T4 SERPL-MCNC: 6.8 UG/DL
TSH SERPL-ACNC: 19.1 M[IU]/L

## 2019-11-18 PROCEDURE — 84436 ASSAY OF TOTAL THYROXINE: CPT | Performed by: INTERNAL MEDICINE

## 2019-11-18 PROCEDURE — 84443 ASSAY THYROID STIM HORMONE: CPT | Performed by: INTERNAL MEDICINE

## 2019-11-18 PROCEDURE — 84460 ALANINE AMINO (ALT) (SGPT): CPT | Performed by: INTERNAL MEDICINE

## 2019-11-18 PROCEDURE — 93306 TTE W/DOPPLER COMPLETE: CPT | Performed by: INTERNAL MEDICINE

## 2019-11-18 PROCEDURE — 36415 COLL VENOUS BLD VENIPUNCTURE: CPT | Performed by: INTERNAL MEDICINE

## 2019-11-18 PROCEDURE — 84450 TRANSFERASE (AST) (SGOT): CPT | Performed by: INTERNAL MEDICINE

## 2019-11-19 ENCOUNTER — TELEPHONE (OUTPATIENT)
Dept: CARDIOLOGY | Age: 79
End: 2019-11-19

## 2019-11-20 ENCOUNTER — DOCUMENTATION (OUTPATIENT)
Dept: CARDIOLOGY | Age: 79
End: 2019-11-20

## 2019-12-04 ENCOUNTER — OFFICE VISIT (OUTPATIENT)
Dept: CARDIOLOGY | Age: 79
End: 2019-12-04

## 2019-12-04 ENCOUNTER — CLINICAL ABSTRACT (OUTPATIENT)
Dept: CARDIOLOGY | Age: 79
End: 2019-12-04

## 2019-12-04 VITALS
SYSTOLIC BLOOD PRESSURE: 128 MMHG | HEART RATE: 58 BPM | WEIGHT: 154 LBS | HEIGHT: 68 IN | DIASTOLIC BLOOD PRESSURE: 72 MMHG | BODY MASS INDEX: 23.34 KG/M2

## 2019-12-04 DIAGNOSIS — Q23.1 BICUSPID AORTIC VALVE: ICD-10-CM

## 2019-12-04 DIAGNOSIS — I71.21 THORACIC ASCENDING AORTIC ANEURYSM (CMD): Primary | ICD-10-CM

## 2019-12-04 DIAGNOSIS — I48.0 PAROXYSMAL ATRIAL FIBRILLATION (CMD): ICD-10-CM

## 2019-12-04 DIAGNOSIS — Z95.2 HISTORY OF AORTIC VALVE REPLACEMENT: ICD-10-CM

## 2019-12-04 DIAGNOSIS — I35.0 AORTIC VALVE STENOSIS, ETIOLOGY OF CARDIAC VALVE DISEASE UNSPECIFIED: ICD-10-CM

## 2019-12-04 DIAGNOSIS — I10 HYPERTENSION, BENIGN: ICD-10-CM

## 2019-12-04 PROCEDURE — 99214 OFFICE O/P EST MOD 30 MIN: CPT | Performed by: INTERNAL MEDICINE

## 2019-12-04 SDOH — HEALTH STABILITY: PHYSICAL HEALTH: ON AVERAGE, HOW MANY MINUTES DO YOU ENGAGE IN EXERCISE AT THIS LEVEL?: 60 MIN

## 2019-12-04 SDOH — HEALTH STABILITY: PHYSICAL HEALTH: ON AVERAGE, HOW MANY DAYS PER WEEK DO YOU ENGAGE IN MODERATE TO STRENUOUS EXERCISE (LIKE A BRISK WALK)?: 7 DAYS

## 2019-12-04 ASSESSMENT — PATIENT HEALTH QUESTIONNAIRE - PHQ9
SUM OF ALL RESPONSES TO PHQ9 QUESTIONS 1 AND 2: 0
2. FEELING DOWN, DEPRESSED OR HOPELESS: NOT AT ALL
SUM OF ALL RESPONSES TO PHQ9 QUESTIONS 1 AND 2: 0
1. LITTLE INTEREST OR PLEASURE IN DOING THINGS: NOT AT ALL

## 2019-12-04 ASSESSMENT — ENCOUNTER SYMPTOMS
HEMOPTYSIS: 0
COUGH: 0
ALLERGIC/IMMUNOLOGIC COMMENTS: NO NEW FOOD ALLERGIES
CHILLS: 0
HEMATOCHEZIA: 0
WEIGHT GAIN: 0
BRUISES/BLEEDS EASILY: 0
WEIGHT LOSS: 0
SUSPICIOUS LESIONS: 0
FEVER: 0

## 2019-12-05 RX ORDER — SPIRONOLACTONE 25 MG/1
25 TABLET ORAL DAILY
Qty: 90 TABLET | Refills: 3 | Status: SHIPPED | OUTPATIENT
Start: 2019-12-05 | End: 2020-07-23

## 2019-12-05 RX ORDER — DIGOXIN 125 MCG
125 TABLET ORAL DAILY
Qty: 90 TABLET | Refills: 3 | Status: SHIPPED | OUTPATIENT
Start: 2019-12-05 | End: 2020-12-09 | Stop reason: SDUPTHER

## 2019-12-05 RX ORDER — LOSARTAN POTASSIUM 25 MG/1
25 TABLET ORAL DAILY
Qty: 90 TABLET | Refills: 3 | Status: SHIPPED | OUTPATIENT
Start: 2019-12-05 | End: 2021-03-08 | Stop reason: SDUPTHER

## 2019-12-05 RX ORDER — CARVEDILOL 6.25 MG/1
6.25 TABLET ORAL 2 TIMES DAILY WITH MEALS
Qty: 180 TABLET | Refills: 3 | Status: SHIPPED | OUTPATIENT
Start: 2019-12-05 | End: 2021-03-22 | Stop reason: SDUPTHER

## 2020-06-10 ENCOUNTER — OFFICE VISIT (OUTPATIENT)
Dept: CARDIOLOGY | Age: 80
End: 2020-06-10

## 2020-06-10 VITALS
DIASTOLIC BLOOD PRESSURE: 78 MMHG | HEIGHT: 68 IN | SYSTOLIC BLOOD PRESSURE: 110 MMHG | HEART RATE: 50 BPM | BODY MASS INDEX: 21.67 KG/M2 | WEIGHT: 143 LBS

## 2020-06-10 DIAGNOSIS — Q23.1 BICUSPID AORTIC VALVE: ICD-10-CM

## 2020-06-10 DIAGNOSIS — I10 HYPERTENSION, BENIGN: ICD-10-CM

## 2020-06-10 DIAGNOSIS — I48.0 PAROXYSMAL ATRIAL FIBRILLATION (CMD): ICD-10-CM

## 2020-06-10 DIAGNOSIS — Z95.2 HISTORY OF AORTIC VALVE REPLACEMENT: ICD-10-CM

## 2020-06-10 DIAGNOSIS — I71.21 THORACIC ASCENDING AORTIC ANEURYSM (CMD): Primary | ICD-10-CM

## 2020-06-10 DIAGNOSIS — I35.0 AORTIC VALVE STENOSIS, ETIOLOGY OF CARDIAC VALVE DISEASE UNSPECIFIED: ICD-10-CM

## 2020-06-10 PROCEDURE — 99442 TELEPHONE E&M BY PHYSICIAN EST PT NOT ORIG PREV 7 DAYS 11-20 MIN: CPT | Performed by: INTERNAL MEDICINE

## 2020-06-10 RX ORDER — ASPIRIN 325 MG
325 TABLET ORAL DAILY
COMMUNITY

## 2020-06-10 SDOH — HEALTH STABILITY: PHYSICAL HEALTH: ON AVERAGE, HOW MANY DAYS PER WEEK DO YOU ENGAGE IN MODERATE TO STRENUOUS EXERCISE (LIKE A BRISK WALK)?: 7 DAYS

## 2020-06-10 SDOH — HEALTH STABILITY: PHYSICAL HEALTH: ON AVERAGE, HOW MANY MINUTES DO YOU ENGAGE IN EXERCISE AT THIS LEVEL?: 60 MIN

## 2020-06-10 ASSESSMENT — ENCOUNTER SYMPTOMS
WEIGHT GAIN: 0
CHILLS: 0
HEMATOCHEZIA: 0
HEMOPTYSIS: 0
ALLERGIC/IMMUNOLOGIC COMMENTS: NO NEW FOOD ALLERGIES
SUSPICIOUS LESIONS: 0
BRUISES/BLEEDS EASILY: 0
COUGH: 0
FEVER: 0
WEIGHT LOSS: 0

## 2020-07-23 RX ORDER — SPIRONOLACTONE 25 MG/1
25 TABLET ORAL DAILY
Qty: 90 TABLET | Refills: 2 | Status: SHIPPED | OUTPATIENT
Start: 2020-07-23 | End: 2021-05-14 | Stop reason: SDUPTHER

## 2020-10-06 LAB
ALBUMIN SERPL-MCNC: 4.6 G/DL
ALP SERPL-CCNC: 42 U/L
ALT SERPL-CCNC: 11 UNITS/L
ANION GAP SERPL CALC-SCNC: 9 MMOL/L
AST SERPL-CCNC: 14 UNITS/L
BILIRUB SERPL-MCNC: 0.3 MG/DL
BUN SERPL-MCNC: 15 MG/DL
CALCIUM SERPL-MCNC: 8.7 MG/DL
CHLORIDE SERPL-SCNC: 104 MMOL/L
CHOLEST SERPL-MCNC: 145 MG/DL
CO2 SERPL-SCNC: 28 MMOL/L
CREAT SERPL-MCNC: 0.8 MG/DL
GLUCOSE SERPL-MCNC: 109 MG/DL
HCT VFR BLD CALC: 40.3 %
HDLC SERPL-MCNC: 48 MG/DL
HGB BLD-MCNC: 13.2 G/DL
LDLC SERPL CALC-MCNC: 84 MG/DL
PLATELET # BLD: 171 K/MCL
POTASSIUM SERPL-SCNC: 4.5 MMOL/L
PROT SERPL-MCNC: 6.1 G/DL
RBC # BLD: 4.1 10*6/UL
SODIUM SERPL-SCNC: 141 MMOL/L
TRIGL SERPL-MCNC: 66 MG/DL
WBC # BLD: 5.8 K/MCL

## 2020-10-07 ENCOUNTER — CLINICAL ABSTRACT (OUTPATIENT)
Dept: CARDIOLOGY | Age: 80
End: 2020-10-07

## 2020-10-14 ENCOUNTER — OFFICE VISIT (OUTPATIENT)
Dept: CARDIOLOGY | Age: 80
End: 2020-10-14

## 2020-10-14 VITALS
SYSTOLIC BLOOD PRESSURE: 120 MMHG | DIASTOLIC BLOOD PRESSURE: 80 MMHG | BODY MASS INDEX: 22.28 KG/M2 | HEIGHT: 68 IN | WEIGHT: 147 LBS | HEART RATE: 62 BPM

## 2020-10-14 DIAGNOSIS — I10 HYPERTENSION, BENIGN: ICD-10-CM

## 2020-10-14 DIAGNOSIS — I35.0 AORTIC VALVE STENOSIS, ETIOLOGY OF CARDIAC VALVE DISEASE UNSPECIFIED: ICD-10-CM

## 2020-10-14 DIAGNOSIS — Q23.1 BICUSPID AORTIC VALVE: ICD-10-CM

## 2020-10-14 DIAGNOSIS — I48.0 PAROXYSMAL ATRIAL FIBRILLATION (CMD): ICD-10-CM

## 2020-10-14 DIAGNOSIS — Z95.2 HISTORY OF AORTIC VALVE REPLACEMENT: ICD-10-CM

## 2020-10-14 DIAGNOSIS — I71.21 THORACIC ASCENDING AORTIC ANEURYSM (CMD): Primary | ICD-10-CM

## 2020-10-14 PROCEDURE — 99214 OFFICE O/P EST MOD 30 MIN: CPT | Performed by: INTERNAL MEDICINE

## 2020-10-14 SDOH — HEALTH STABILITY: PHYSICAL HEALTH: ON AVERAGE, HOW MANY DAYS PER WEEK DO YOU ENGAGE IN MODERATE TO STRENUOUS EXERCISE (LIKE A BRISK WALK)?: 7 DAYS

## 2020-10-14 SDOH — HEALTH STABILITY: PHYSICAL HEALTH: ON AVERAGE, HOW MANY MINUTES DO YOU ENGAGE IN EXERCISE AT THIS LEVEL?: 60 MIN

## 2020-10-14 ASSESSMENT — ENCOUNTER SYMPTOMS
HEMOPTYSIS: 0
WEIGHT GAIN: 0
ALLERGIC/IMMUNOLOGIC COMMENTS: NO NEW FOOD ALLERGIES
FEVER: 0
BRUISES/BLEEDS EASILY: 0
EYES NEGATIVE: 1
COUGH: 0
ENDOCRINE NEGATIVE: 1
HEMATOCHEZIA: 0
WEIGHT LOSS: 0
SUSPICIOUS LESIONS: 0
CHILLS: 0

## 2020-10-14 ASSESSMENT — PATIENT HEALTH QUESTIONNAIRE - PHQ9
2. FEELING DOWN, DEPRESSED OR HOPELESS: NOT AT ALL
CLINICAL INTERPRETATION OF PHQ9 SCORE: NO FURTHER SCREENING NEEDED
1. LITTLE INTEREST OR PLEASURE IN DOING THINGS: NOT AT ALL
CLINICAL INTERPRETATION OF PHQ2 SCORE: NO FURTHER SCREENING NEEDED
SUM OF ALL RESPONSES TO PHQ9 QUESTIONS 1 AND 2: 0
SUM OF ALL RESPONSES TO PHQ9 QUESTIONS 1 AND 2: 0

## 2020-10-31 NOTE — CDS QUERY
Addended by: VIOLA CAMPBELL on: 10/31/2020 03:35 PM     Modules accepted: Orders     Heart Failure  CLINICAL DOCUMENTATION CLARIFICATION FORM  Dear Doctor:  Clinical information (provided below) includes a diagnosis of Heart Failure.  For accurate ICD-10-CM code assignment to reflect severity of illness and risk of mortality,  PLEASE “X” AL

## 2020-12-09 ENCOUNTER — TELEPHONE (OUTPATIENT)
Dept: CARDIOLOGY | Age: 80
End: 2020-12-09

## 2020-12-09 RX ORDER — DIGOXIN 125 MCG
125 TABLET ORAL DAILY
Qty: 90 TABLET | Refills: 3 | Status: SHIPPED | OUTPATIENT
Start: 2020-12-09 | End: 2021-12-30 | Stop reason: SDUPTHER

## 2021-03-08 RX ORDER — LOSARTAN POTASSIUM 25 MG/1
25 TABLET ORAL DAILY
Qty: 90 TABLET | Refills: 3 | Status: SHIPPED | OUTPATIENT
Start: 2021-03-08 | End: 2022-02-22 | Stop reason: SDUPTHER

## 2021-03-22 RX ORDER — CARVEDILOL 6.25 MG/1
6.25 TABLET ORAL 2 TIMES DAILY WITH MEALS
Qty: 180 TABLET | Refills: 3 | Status: SHIPPED | OUTPATIENT
Start: 2021-03-22 | End: 2022-04-19 | Stop reason: SDUPTHER

## 2021-05-14 RX ORDER — SPIRONOLACTONE 25 MG/1
25 TABLET ORAL DAILY
Qty: 90 TABLET | Refills: 1 | Status: SHIPPED | OUTPATIENT
Start: 2021-05-14 | End: 2021-11-11

## 2021-06-21 NOTE — CARDIAC REHAB
Cardiac rehab education attempted. Patient have echocardiogram.  Will revisit as time allows. Krys Mathews is a 38 year old female here for a health maintenance exam.    Patient Care Team:  Radha Dillon MD as PCP - General (Family Practice)  Praneeth Colin MD (Ophthalmology)  Yaron Major III, MD (Neurology)  Past medical history  Depression  Hypertension patient has not been the blood pressure medications Zestoretic for the last 3 months there for her blood pressure is elevated  Migraines  Obesity BMI is 34      Current Outpatient Medications   Medication Sig Dispense Refill   • acetaminophen (TYLENOL) 325 MG tablet Take 650 mg by mouth as needed (headache).     • aspirin-acetaminophen-caffeine (EXCEDRIN MIGRAINE) 250-250-65 MG per tablet Take 1 tablet by mouth as needed (severe headaches).     • Multiple Vitamins-Minerals (MULTIVITAMIN PO) Take 1 tablet by mouth daily.     • levonorgestrel (MIRENA) 20 MCG/24HR IUD 1 each by Intrauterine route once.     • lisinopril-hydroCHLOROthiazide (ZESTORETIC) 10-12.5 MG per tablet Take 1 tablet by mouth daily. 90 tablet 1     No current facility-administered medications for this visit.       Past Surgical History:   Procedure Laterality Date   • Ovarian cyst removal     • Tonsillectomy and adenoidectomy         ALLERGIES:  No Known Allergies    Family History   Problem Relation Age of Onset   • High blood pressure Mother    • High cholesterol Mother    • Asthma Father    • COPD Father    • Diabetes Father    • Heart disease Father         triple bypass   • High blood pressure Father    • High cholesterol Father    • Mental retardation Sister    • Learning disabilities Brother    • Asthma Daughter    • Learning disabilities Daughter    • Cancer Paternal Grandmother         breast   • Heart disease Paternal Grandfather        Social History     Tobacco Use   • Smoking status: Never Smoker   • Smokeless tobacco: Never Used   Vaping Use   • Vaping Use: never used   Substance Use Topics   • Alcohol use: Yes     Alcohol/week: 0.0 - 2.0 standard drinks   • Drug use: No      Review of patient's social economics indicates:    Alex                   Immunizations:  Tdap/Td:  up to date and will be given today  Pneumococcal:  not indicated  Influenza:  not indicated    Immunization History   Administered Date(s) Administered   • MMR 1995   • Tdap 2008   Pended Date(s) Pended   • Td:Adult type tetanus/diphtheria 2021       Health Maintenance:  Pap smear:  due  Mammogram:  not indicated    Health Maintenance   Topic Date Due   • COVID-19 Vaccine (1) Never done   • Cervical Cancer Screening HPV CO-Testing  Never done   • DTaP/Tdap/Td Vaccine (2 - Td or Tdap) 2018   • Depression Screening  2021   • Influenza Vaccine (Season Ended) 2021   • Hepatitis B Vaccine  Aged Out   • Meningococcal Vaccine  Aged Out   • HPV Vaccine  Aged Out   • Pneumococcal Vaccine 0-64  Aged Out       Obstetric History:       Gynecologic History:  Patient's last menstrual period was 2021.  She has an IUD that is due to be changed and she is also due for Pap and wishes to have referral to OB/GYN  REVIEW OF SYSTEMS  Constitutional: Negative for fever, chills, appetite change, or fatigue.  Skin: Negative for rash or wounds.   HEENT: Negative for eye drainage, rhinorrhea, ear pain, sore throat or neck pain.  Respiratory: Negative cough, wheezing or shortness of breath.    Cardiovascular: Negative for chest pain, chest pressure, palpitations, diaphoresis or edema.   Gastrointestinal: Negative for nausea, vomiting, diarrhea, abdominal pain, black or tarry stools.  Genitourinary: Negative for dysuria, urgency, frequency, hematuria or flank pain.  Extremities: Negative for joint swelling or joint pain.  Neurologic: Negative for change in sensory or motor function. Negative for headache or migraine aura. No change in gait. No history of vertigo. No change in vision or speech.  Endocrine: Negative for heat or cold intolerance, polydipsia, weight loss or  gain.  Hematological: Negative for bleeding, bruising or lymphadenopathy.  Psychiatric: Negative for change in affect, anxiety, depression, mentation or sleep disturbance.    I have reviewed above ROS collected by nurse.    PHYSICAL EXAMINATION:  Visit Vitals  BP (!) 138/100 (BP Location: LUE - Left upper extremity, Patient Position: Sitting, Cuff Size: Regular)   Pulse 64   Temp 98.3 °F (36.8 °C) (Temporal)   Ht 5' 7.25\" (1.708 m)   Wt 99.9 kg   LMP 03/25/2021 Comment: irregular menses with IUD   BMI 34.24 kg/m²    Body mass index is 34.24 kg/m².  HEENT: TMs are negative. Pupils are equal. Nasal mucosa is unremarkable. Oropharynx is clear. Neck without adenopathy or thyromegaly. No bruits. Dentition in good repair.  Lungs: Clear to auscultation.  Cardiac: Regular rate and rhythm. Murmur absent  Breasts:  Declined patient will be seen OB/GYN   Abdomen: Normoactive bowel sounds, soft, nontender, nondistended, without organomegaly.  Pelvic:  Declined patient will be seeing OB/GYN    Extremities: Without edema. Good peripheral pulses    Assessment and Plan:  #1 Health maintenance examination: Patient is due for Pap and she states she prefers to get through Ob gyn consult request sent  Immunizations reviewed and due for tetanus which is provided.  COVID vaccination declined Advance Directives information given today  2. Hypertension blood pressure elevated but has not been compliant with her medication she will restart and BP recheck arranged            Return in about 6 months (around 12/21/2021) for htn.

## 2021-07-02 ENCOUNTER — APPOINTMENT (OUTPATIENT)
Dept: CARDIOLOGY | Age: 81
End: 2021-07-02

## 2021-07-13 ENCOUNTER — LAB SERVICES (OUTPATIENT)
Dept: LAB | Age: 81
End: 2021-07-13

## 2021-07-13 DIAGNOSIS — Z95.2 HISTORY OF AORTIC VALVE REPLACEMENT: ICD-10-CM

## 2021-07-13 DIAGNOSIS — Q23.1 BICUSPID AORTIC VALVE: ICD-10-CM

## 2021-07-13 DIAGNOSIS — I48.0 PAROXYSMAL ATRIAL FIBRILLATION (CMD): ICD-10-CM

## 2021-07-13 DIAGNOSIS — I35.0 AORTIC VALVE STENOSIS, ETIOLOGY OF CARDIAC VALVE DISEASE UNSPECIFIED: ICD-10-CM

## 2021-07-13 DIAGNOSIS — I71.21 THORACIC ASCENDING AORTIC ANEURYSM (CMD): ICD-10-CM

## 2021-07-13 DIAGNOSIS — I10 HYPERTENSION, BENIGN: ICD-10-CM

## 2021-07-13 PROCEDURE — 85027 COMPLETE CBC AUTOMATED: CPT | Performed by: CLINICAL MEDICAL LABORATORY

## 2021-07-13 PROCEDURE — 80061 LIPID PANEL: CPT | Performed by: CLINICAL MEDICAL LABORATORY

## 2021-07-13 PROCEDURE — 80053 COMPREHEN METABOLIC PANEL: CPT | Performed by: CLINICAL MEDICAL LABORATORY

## 2021-07-14 LAB
ALBUMIN SERPL-MCNC: 3.9 G/DL (ref 3.6–5.1)
ALBUMIN/GLOB SERPL: 1.9 {RATIO} (ref 1–2.4)
ALP SERPL-CCNC: 39 UNITS/L (ref 45–117)
ALT SERPL-CCNC: 15 UNITS/L
ANION GAP SERPL CALC-SCNC: 11 MMOL/L (ref 10–20)
AST SERPL-CCNC: 8 UNITS/L
BILIRUB SERPL-MCNC: 0.6 MG/DL (ref 0.2–1)
BUN SERPL-MCNC: 15 MG/DL (ref 6–20)
BUN/CREAT SERPL: 17 (ref 7–25)
CALCIUM SERPL-MCNC: 9 MG/DL (ref 8.4–10.2)
CHLORIDE SERPL-SCNC: 103 MMOL/L (ref 98–107)
CHOLEST SERPL-MCNC: 190 MG/DL
CHOLEST/HDLC SERPL: 4.1 {RATIO}
CO2 SERPL-SCNC: 30 MMOL/L (ref 21–32)
CREAT SERPL-MCNC: 0.86 MG/DL (ref 0.67–1.17)
DEPRECATED RDW RBC: 52.2 FL (ref 39–50)
ERYTHROCYTE [DISTWIDTH] IN BLOOD: 14.5 % (ref 11–15)
FASTING DURATION TIME PATIENT: ABNORMAL H
FASTING DURATION TIME PATIENT: NORMAL H
GFR SERPLBLD BASED ON 1.73 SQ M-ARVRAT: 82 ML/MIN/1.73M2
GLOBULIN SER-MCNC: 2.1 G/DL (ref 2–4)
GLUCOSE SERPL-MCNC: 99 MG/DL (ref 65–99)
HCT VFR BLD CALC: 40.4 % (ref 39–51)
HDLC SERPL-MCNC: 46 MG/DL
HGB BLD-MCNC: 13.4 G/DL (ref 13–17)
LDLC SERPL CALC-MCNC: 129 MG/DL
MCH RBC QN AUTO: 32.5 PG (ref 26–34)
MCHC RBC AUTO-ENTMCNC: 33.2 G/DL (ref 32–36.5)
MCV RBC AUTO: 98.1 FL (ref 78–100)
NONHDLC SERPL-MCNC: 144 MG/DL
NRBC BLD MANUAL-RTO: 0 /100 WBC
PLATELET # BLD AUTO: 196 K/MCL (ref 140–450)
POTASSIUM SERPL-SCNC: 4.5 MMOL/L (ref 3.4–5.1)
PROT SERPL-MCNC: 6 G/DL (ref 6.4–8.2)
RBC # BLD: 4.12 MIL/MCL (ref 4.5–5.9)
SODIUM SERPL-SCNC: 139 MMOL/L (ref 135–145)
TRIGL SERPL-MCNC: 76 MG/DL
WBC # BLD: 5 K/MCL (ref 4.2–11)

## 2021-07-19 ENCOUNTER — ANCILLARY PROCEDURE (OUTPATIENT)
Dept: CARDIOLOGY | Age: 81
End: 2021-07-19
Attending: INTERNAL MEDICINE

## 2021-07-19 DIAGNOSIS — I35.0 AORTIC VALVE STENOSIS, ETIOLOGY OF CARDIAC VALVE DISEASE UNSPECIFIED: ICD-10-CM

## 2021-07-19 DIAGNOSIS — I10 HYPERTENSION, BENIGN: ICD-10-CM

## 2021-07-19 DIAGNOSIS — Q23.1 BICUSPID AORTIC VALVE: ICD-10-CM

## 2021-07-19 DIAGNOSIS — I71.21 THORACIC ASCENDING AORTIC ANEURYSM (CMD): ICD-10-CM

## 2021-07-19 DIAGNOSIS — I48.0 PAROXYSMAL ATRIAL FIBRILLATION (CMD): ICD-10-CM

## 2021-07-19 DIAGNOSIS — Z95.2 HISTORY OF AORTIC VALVE REPLACEMENT: ICD-10-CM

## 2021-07-19 PROCEDURE — 76376 3D RENDER W/INTRP POSTPROCES: CPT | Performed by: INTERNAL MEDICINE

## 2021-07-19 PROCEDURE — 93306 TTE W/DOPPLER COMPLETE: CPT | Performed by: INTERNAL MEDICINE

## 2021-07-21 ENCOUNTER — OFFICE VISIT (OUTPATIENT)
Dept: CARDIOLOGY | Age: 81
End: 2021-07-21

## 2021-07-21 VITALS
HEART RATE: 54 BPM | SYSTOLIC BLOOD PRESSURE: 128 MMHG | BODY MASS INDEX: 22.13 KG/M2 | HEIGHT: 68 IN | WEIGHT: 146 LBS | DIASTOLIC BLOOD PRESSURE: 77 MMHG

## 2021-07-21 DIAGNOSIS — Q23.1 BICUSPID AORTIC VALVE: ICD-10-CM

## 2021-07-21 DIAGNOSIS — I48.0 PAROXYSMAL ATRIAL FIBRILLATION (CMD): ICD-10-CM

## 2021-07-21 DIAGNOSIS — Z95.2 HISTORY OF AORTIC VALVE REPLACEMENT: ICD-10-CM

## 2021-07-21 DIAGNOSIS — I10 HYPERTENSION, BENIGN: ICD-10-CM

## 2021-07-21 DIAGNOSIS — I71.21 THORACIC ASCENDING AORTIC ANEURYSM (CMD): Primary | ICD-10-CM

## 2021-07-21 DIAGNOSIS — I35.0 AORTIC VALVE STENOSIS, ETIOLOGY OF CARDIAC VALVE DISEASE UNSPECIFIED: ICD-10-CM

## 2021-07-21 PROCEDURE — 99214 OFFICE O/P EST MOD 30 MIN: CPT | Performed by: INTERNAL MEDICINE

## 2021-07-21 ASSESSMENT — PATIENT HEALTH QUESTIONNAIRE - PHQ9
CLINICAL INTERPRETATION OF PHQ9 SCORE: NO FURTHER SCREENING NEEDED
CLINICAL INTERPRETATION OF PHQ2 SCORE: NO FURTHER SCREENING NEEDED
SUM OF ALL RESPONSES TO PHQ9 QUESTIONS 1 AND 2: 0
SUM OF ALL RESPONSES TO PHQ9 QUESTIONS 1 AND 2: 0
2. FEELING DOWN, DEPRESSED OR HOPELESS: NOT AT ALL
1. LITTLE INTEREST OR PLEASURE IN DOING THINGS: NOT AT ALL

## 2021-08-24 ENCOUNTER — TELEPHONE (OUTPATIENT)
Dept: CARDIOLOGY | Age: 81
End: 2021-08-24

## 2021-08-25 RX ORDER — CLINDAMYCIN HYDROCHLORIDE 300 MG/1
600 CAPSULE ORAL SEE ADMIN INSTRUCTIONS
Qty: 2 CAPSULE | Refills: 0 | Status: SHIPPED | OUTPATIENT
Start: 2021-08-25

## 2021-11-11 RX ORDER — SPIRONOLACTONE 25 MG/1
25 TABLET ORAL DAILY
Qty: 90 TABLET | Refills: 3 | Status: SHIPPED | OUTPATIENT
Start: 2021-11-11 | End: 2021-11-16 | Stop reason: SDUPTHER

## 2021-11-16 ENCOUNTER — TELEPHONE (OUTPATIENT)
Dept: CARDIOLOGY | Age: 81
End: 2021-11-16

## 2021-11-16 RX ORDER — SPIRONOLACTONE 25 MG/1
25 TABLET ORAL DAILY
Qty: 90 TABLET | Refills: 3 | Status: SHIPPED | OUTPATIENT
Start: 2021-11-16 | End: 2022-10-10 | Stop reason: SDUPTHER

## 2021-12-16 RX ORDER — BACLOFEN 20 MG/1
TABLET ORAL
OUTPATIENT
Start: 2021-12-16

## 2021-12-30 ENCOUNTER — TELEPHONE (OUTPATIENT)
Dept: CARDIOLOGY | Age: 81
End: 2021-12-30

## 2021-12-30 RX ORDER — DIGOXIN 125 MCG
125 TABLET ORAL DAILY
Qty: 90 TABLET | Refills: 3 | Status: SHIPPED | OUTPATIENT
Start: 2021-12-30 | End: 2022-09-30 | Stop reason: SDUPTHER

## 2022-02-22 RX ORDER — LOSARTAN POTASSIUM 25 MG/1
25 TABLET ORAL DAILY
Qty: 90 TABLET | Refills: 3 | Status: SHIPPED | OUTPATIENT
Start: 2022-02-22 | End: 2023-02-27 | Stop reason: SDUPTHER

## 2022-04-11 ENCOUNTER — OFFICE VISIT (OUTPATIENT)
Dept: CARDIOLOGY | Age: 82
End: 2022-04-11

## 2022-04-11 VITALS
SYSTOLIC BLOOD PRESSURE: 117 MMHG | DIASTOLIC BLOOD PRESSURE: 69 MMHG | BODY MASS INDEX: 22.73 KG/M2 | HEIGHT: 68 IN | HEART RATE: 57 BPM | WEIGHT: 150 LBS

## 2022-04-11 DIAGNOSIS — I48.0 PAROXYSMAL ATRIAL FIBRILLATION (CMD): ICD-10-CM

## 2022-04-11 DIAGNOSIS — I35.0 AORTIC VALVE STENOSIS, ETIOLOGY OF CARDIAC VALVE DISEASE UNSPECIFIED: ICD-10-CM

## 2022-04-11 DIAGNOSIS — Z95.2 HISTORY OF AORTIC VALVE REPLACEMENT: ICD-10-CM

## 2022-04-11 DIAGNOSIS — I10 HYPERTENSION, BENIGN: ICD-10-CM

## 2022-04-11 DIAGNOSIS — Q23.1 BICUSPID AORTIC VALVE: ICD-10-CM

## 2022-04-11 DIAGNOSIS — I71.21 THORACIC ASCENDING AORTIC ANEURYSM (CMD): Primary | ICD-10-CM

## 2022-04-11 PROCEDURE — 99214 OFFICE O/P EST MOD 30 MIN: CPT | Performed by: INTERNAL MEDICINE

## 2022-04-11 SDOH — HEALTH STABILITY: PHYSICAL HEALTH: ON AVERAGE, HOW MANY DAYS PER WEEK DO YOU ENGAGE IN MODERATE TO STRENUOUS EXERCISE (LIKE A BRISK WALK)?: 7 DAYS

## 2022-04-11 ASSESSMENT — PATIENT HEALTH QUESTIONNAIRE - PHQ9
1. LITTLE INTEREST OR PLEASURE IN DOING THINGS: NOT AT ALL
SUM OF ALL RESPONSES TO PHQ9 QUESTIONS 1 AND 2: 0
SUM OF ALL RESPONSES TO PHQ9 QUESTIONS 1 AND 2: 0
2. FEELING DOWN, DEPRESSED OR HOPELESS: NOT AT ALL
CLINICAL INTERPRETATION OF PHQ2 SCORE: NO FURTHER SCREENING NEEDED

## 2022-04-20 ENCOUNTER — APPOINTMENT (OUTPATIENT)
Dept: CARDIOLOGY | Age: 82
End: 2022-04-20

## 2022-04-21 RX ORDER — CARVEDILOL 6.25 MG/1
6.25 TABLET ORAL 2 TIMES DAILY WITH MEALS
Qty: 180 TABLET | Refills: 3 | Status: SHIPPED | OUTPATIENT
Start: 2022-04-21 | End: 2023-04-14 | Stop reason: SDUPTHER

## 2022-09-30 ENCOUNTER — TELEPHONE (OUTPATIENT)
Dept: CARDIOLOGY | Age: 82
End: 2022-09-30

## 2022-09-30 RX ORDER — DIGOXIN 125 MCG
125 TABLET ORAL DAILY
Qty: 90 TABLET | Refills: 0 | Status: SHIPPED | OUTPATIENT
Start: 2022-09-30 | End: 2022-10-10 | Stop reason: SDUPTHER

## 2022-10-10 ENCOUNTER — OFFICE VISIT (OUTPATIENT)
Dept: CARDIOLOGY | Age: 82
End: 2022-10-10

## 2022-10-10 VITALS
DIASTOLIC BLOOD PRESSURE: 78 MMHG | HEIGHT: 68 IN | WEIGHT: 154 LBS | HEART RATE: 70 BPM | BODY MASS INDEX: 23.34 KG/M2 | SYSTOLIC BLOOD PRESSURE: 121 MMHG

## 2022-10-10 DIAGNOSIS — Z95.2 HISTORY OF AORTIC VALVE REPLACEMENT: ICD-10-CM

## 2022-10-10 DIAGNOSIS — I48.0 PAROXYSMAL ATRIAL FIBRILLATION (CMD): ICD-10-CM

## 2022-10-10 DIAGNOSIS — Q23.1 BICUSPID AORTIC VALVE: ICD-10-CM

## 2022-10-10 DIAGNOSIS — I10 HYPERTENSION, BENIGN: ICD-10-CM

## 2022-10-10 DIAGNOSIS — I71.21 ANEURYSM OF ASCENDING AORTA WITHOUT RUPTURE (CMD): Primary | ICD-10-CM

## 2022-10-10 DIAGNOSIS — I35.0 NONRHEUMATIC AORTIC VALVE STENOSIS: ICD-10-CM

## 2022-10-10 PROCEDURE — 99214 OFFICE O/P EST MOD 30 MIN: CPT | Performed by: INTERNAL MEDICINE

## 2022-10-10 RX ORDER — GABAPENTIN 600 MG/1
600 TABLET ORAL 3 TIMES DAILY
COMMUNITY
Start: 2022-07-14

## 2022-10-10 RX ORDER — DIGOXIN 125 MCG
125 TABLET ORAL DAILY
Qty: 90 TABLET | Refills: 3 | Status: SHIPPED | OUTPATIENT
Start: 2022-10-10 | End: 2023-01-12 | Stop reason: SDUPTHER

## 2022-10-10 RX ORDER — SPIRONOLACTONE 25 MG/1
25 TABLET ORAL DAILY
Qty: 90 TABLET | Refills: 3 | Status: SHIPPED | OUTPATIENT
Start: 2022-10-10 | End: 2022-12-12 | Stop reason: SDUPTHER

## 2022-10-10 SDOH — HEALTH STABILITY: PHYSICAL HEALTH: ON AVERAGE, HOW MANY DAYS PER WEEK DO YOU ENGAGE IN MODERATE TO STRENUOUS EXERCISE (LIKE A BRISK WALK)?: 7 DAYS

## 2022-10-10 SDOH — HEALTH STABILITY: PHYSICAL HEALTH: ON AVERAGE, HOW MANY MINUTES DO YOU ENGAGE IN EXERCISE AT THIS LEVEL?: 60 MIN

## 2022-12-12 ENCOUNTER — TELEPHONE (OUTPATIENT)
Dept: CARDIOLOGY | Age: 82
End: 2022-12-12

## 2022-12-12 RX ORDER — SPIRONOLACTONE 25 MG/1
25 TABLET ORAL DAILY
Qty: 14 TABLET | Refills: 0 | Status: SHIPPED | OUTPATIENT
Start: 2022-12-12 | End: 2022-12-12 | Stop reason: SDUPTHER

## 2022-12-12 RX ORDER — SPIRONOLACTONE 25 MG/1
25 TABLET ORAL DAILY
Qty: 90 TABLET | Refills: 3 | Status: SHIPPED | OUTPATIENT
Start: 2022-12-12

## 2022-12-30 RX ORDER — SPIRONOLACTONE 25 MG/1
25 TABLET ORAL DAILY
Qty: 90 TABLET | Refills: 3 | Status: CANCELLED | OUTPATIENT
Start: 2022-12-30

## 2023-01-03 RX ORDER — DIGOXIN 125 MCG
125 TABLET ORAL DAILY
Qty: 90 TABLET | Refills: 3 | OUTPATIENT
Start: 2023-01-03

## 2023-01-12 RX ORDER — DIGOXIN 125 MCG
125 TABLET ORAL DAILY
Qty: 90 TABLET | Refills: 1 | Status: SHIPPED | OUTPATIENT
Start: 2023-01-12

## 2023-02-27 RX ORDER — LOSARTAN POTASSIUM 25 MG/1
25 TABLET ORAL DAILY
Qty: 90 TABLET | Refills: 3 | Status: SHIPPED | OUTPATIENT
Start: 2023-02-27

## 2023-04-14 RX ORDER — CARVEDILOL 6.25 MG/1
6.25 TABLET ORAL 2 TIMES DAILY WITH MEALS
Qty: 180 TABLET | Refills: 3 | Status: SHIPPED | OUTPATIENT
Start: 2023-04-14

## 2023-07-06 ENCOUNTER — EXTERNAL LAB (OUTPATIENT)
Dept: CARDIOLOGY | Age: 83
End: 2023-07-06

## 2023-07-06 LAB
ABSOLUTE IMMATURE GRANULOCYTES (OFFPRE24): 0 10^3/UL (ref 0–0.1)
ABSOLUTE NRBC (AUTO): 0 10^3/UL
ALBUMIN SERPL-MCNC: 4.2 G/DL (ref 3.5–5)
ALP SERPL-CCNC: 37 UNITS/L (ref 34–104)
ALT SERPL-CCNC: 10 UNITS/L (ref 11–51)
ANION GAP SERPL CALC-SCNC: 8 MMOL/L (ref 4–13)
AST SERPL-CCNC: 13 UNITS/L (ref 13–39)
BASOPHILS # BLD: 0 10^3/UL (ref 0–0.1)
BASOPHILS NFR BLD: 0.5 % (ref 0–2)
BILIRUB SERPL-MCNC: 0.3 MG/DL (ref 0.2–1.2)
BUN SERPL-MCNC: 30 MG/DL (ref 7–25)
CALCIUM SERPL-MCNC: 8.6 MG/DL (ref 8.3–10.5)
CHLORIDE SERPL-SCNC: 105 MMOL/L (ref 98–107)
CHOLEST SERPL-MCNC: 144 MG/DL (ref 0–199)
CHOLEST/HDLC SERPL: 3.6 {RATIO} (ref 0–5)
CO2 SERPL-SCNC: 27 MMOL/L (ref 21–31)
CREAT SERPL-MCNC: 0.98 MG/DL (ref 0.6–1.3)
EOSINOPHIL # BLD: 0.1 10^3/UL (ref 0–0.6)
EOSINOPHIL NFR BLD: 2.3 % (ref 0–9)
ERYTHROCYTE [DISTWIDTH] IN BLOOD: 14.6 % (ref 11–15)
GFR SERPLBLD SCHWARTZ-ARVRAT: 77 ML/MIN/1.73 M2
GLUCOSE SERPL-MCNC: 84 MG/DL (ref 70–100)
HCT VFR BLD CALC: 37.4 %
HDLC SERPL-MCNC: 40 MG/DL
HGB BLD-MCNC: 12 G/DL
IMMATURE GRANULOCYTES (OFFPRE25): 0.5 %
LDLC SERPL CALC-MCNC: 88 MG/DL (ref 0–99)
LENGTH OF FAST TIME PATIENT: ABNORMAL H
LENGTH OF FAST TIME PATIENT: ABNORMAL H
LYMPHOCYTES # BLD: 1.9 10^3/UL (ref 0.7–3.4)
LYMPHOCYTES NFR BLD: 30 % (ref 16–48)
MCH RBC QN AUTO: 32.3 PG (ref 27–33)
MCHC RBC AUTO-ENTMCNC: 32.1 G/DL (ref 32–36)
MCV RBC AUTO: 100.5 FL (ref 82–99)
MONOCYTES # BLD: 1.1 10^3/UL (ref 0.3–1)
MONOCYTES NFR BLD: 18.7 % (ref 4–14)
MPV (OFFPRE2): 10.1 FL (ref 9.8–12.7)
NEUTROPHILS # BLD: ABNORMAL 10^3/UL (ref 1.1–6)
NEUTROPHILS NFR BLD: 47.1 % (ref 37–72)
NONHDLC SERPL-MCNC: 104 MG/DL
NRBC BLD MANUAL-RTO: 0 %
PLATELET # BLD: 204 10^3/UL (ref 150–450)
POTASSIUM SERPL-SCNC: 4.6 MMOL/L (ref 3.5–5.1)
PROT SERPL-MCNC: 5.9 G/DL (ref 6.4–8.3)
RBC # BLD: 3.72 10^6/UL
SODIUM SERPL-SCNC: 140 MMOL/L (ref 133–146)
TRIGL SERPL-MCNC: 72 MG/DL (ref 0–150)
WBC # BLD: 6.1 10^3/UL (ref 3.6–10.2)

## 2023-07-10 ENCOUNTER — APPOINTMENT (OUTPATIENT)
Dept: CARDIOLOGY | Age: 83
End: 2023-07-10

## 2023-07-12 ENCOUNTER — APPOINTMENT (OUTPATIENT)
Dept: GENERAL RADIOLOGY | Facility: HOSPITAL | Age: 83
End: 2023-07-12
Attending: EMERGENCY MEDICINE
Payer: MEDICARE

## 2023-07-12 ENCOUNTER — APPOINTMENT (OUTPATIENT)
Dept: CARDIOLOGY | Age: 83
End: 2023-07-12

## 2023-07-12 ENCOUNTER — APPOINTMENT (OUTPATIENT)
Dept: CT IMAGING | Facility: HOSPITAL | Age: 83
End: 2023-07-12
Attending: EMERGENCY MEDICINE
Payer: MEDICARE

## 2023-07-12 ENCOUNTER — APPOINTMENT (OUTPATIENT)
Dept: CARDIOLOGY | Age: 83
End: 2023-07-12
Attending: INTERNAL MEDICINE

## 2023-07-12 ENCOUNTER — HOSPITAL ENCOUNTER (EMERGENCY)
Facility: HOSPITAL | Age: 83
Discharge: ANOTHER HOSPITAL | End: 2023-07-12
Attending: EMERGENCY MEDICINE
Payer: MEDICARE

## 2023-07-12 VITALS
SYSTOLIC BLOOD PRESSURE: 92 MMHG | TEMPERATURE: 98 F | OXYGEN SATURATION: 96 % | HEART RATE: 88 BPM | DIASTOLIC BLOOD PRESSURE: 60 MMHG | RESPIRATION RATE: 12 BRPM

## 2023-07-12 DIAGNOSIS — D72.829 LEUKOCYTOSIS, UNSPECIFIED TYPE: ICD-10-CM

## 2023-07-12 DIAGNOSIS — S72.141A CLOSED COMMINUTED INTERTROCHANTERIC FRACTURE OF RIGHT FEMUR, INITIAL ENCOUNTER (HCC): ICD-10-CM

## 2023-07-12 DIAGNOSIS — D64.9 ANEMIA, UNSPECIFIED TYPE: ICD-10-CM

## 2023-07-12 DIAGNOSIS — N28.9 RENAL INSUFFICIENCY: ICD-10-CM

## 2023-07-12 DIAGNOSIS — S72.001A CLOSED FRACTURE OF RIGHT HIP, INITIAL ENCOUNTER (HCC): Primary | ICD-10-CM

## 2023-07-12 LAB
ALBUMIN SERPL-MCNC: 4 G/DL (ref 3.4–5)
ALBUMIN/GLOB SERPL: 1.6 {RATIO} (ref 1–2)
ALP LIVER SERPL-CCNC: 33 U/L
ALT SERPL-CCNC: 29 U/L
ANION GAP SERPL CALC-SCNC: 6 MMOL/L (ref 0–18)
AST SERPL-CCNC: 44 U/L (ref 15–37)
BASOPHILS # BLD AUTO: 0.02 X10(3) UL (ref 0–0.2)
BASOPHILS NFR BLD AUTO: 0.1 %
BILIRUB SERPL-MCNC: 0.7 MG/DL (ref 0.1–2)
BUN BLD-MCNC: 40 MG/DL (ref 7–18)
CALCIUM BLD-MCNC: 8.9 MG/DL (ref 8.5–10.1)
CHLORIDE SERPL-SCNC: 109 MMOL/L (ref 98–112)
CO2 SERPL-SCNC: 23 MMOL/L (ref 21–32)
CREAT BLD-MCNC: 1.48 MG/DL
EOSINOPHIL # BLD AUTO: 0.02 X10(3) UL (ref 0–0.7)
EOSINOPHIL NFR BLD AUTO: 0.1 %
ERYTHROCYTE [DISTWIDTH] IN BLOOD BY AUTOMATED COUNT: 14 %
GFR SERPLBLD BASED ON 1.73 SQ M-ARVRAT: 47 ML/MIN/1.73M2 (ref 60–?)
GLOBULIN PLAS-MCNC: 2.5 G/DL (ref 2.8–4.4)
GLUCOSE BLD-MCNC: 106 MG/DL (ref 70–99)
HCT VFR BLD AUTO: 36.3 %
HGB BLD-MCNC: 12 G/DL
IMM GRANULOCYTES # BLD AUTO: 0.48 X10(3) UL (ref 0–1)
IMM GRANULOCYTES NFR BLD: 2.9 %
LYMPHOCYTES # BLD AUTO: 1.12 X10(3) UL (ref 1–4)
LYMPHOCYTES NFR BLD AUTO: 6.8 %
MCH RBC QN AUTO: 33.1 PG (ref 26–34)
MCHC RBC AUTO-ENTMCNC: 33.1 G/DL (ref 31–37)
MCV RBC AUTO: 100 FL
MONOCYTES # BLD AUTO: 3.14 X10(3) UL (ref 0.1–1)
MONOCYTES NFR BLD AUTO: 19.2 %
NEUTROPHILS # BLD AUTO: 11.59 X10 (3) UL (ref 1.5–7.7)
NEUTROPHILS # BLD AUTO: 11.59 X10(3) UL (ref 1.5–7.7)
NEUTROPHILS NFR BLD AUTO: 70.9 %
OSMOLALITY SERPL CALC.SUM OF ELEC: 296 MOSM/KG (ref 275–295)
PLATELET # BLD AUTO: 186 10(3)UL (ref 150–450)
POTASSIUM SERPL-SCNC: 4.8 MMOL/L (ref 3.5–5.1)
PROT SERPL-MCNC: 6.5 G/DL (ref 6.4–8.2)
RBC # BLD AUTO: 3.63 X10(6)UL
SARS-COV-2 RNA RESP QL NAA+PROBE: NOT DETECTED
SODIUM SERPL-SCNC: 138 MMOL/L (ref 136–145)
WBC # BLD AUTO: 16.4 X10(3) UL (ref 4–11)

## 2023-07-12 PROCEDURE — 73552 X-RAY EXAM OF FEMUR 2/>: CPT | Performed by: EMERGENCY MEDICINE

## 2023-07-12 PROCEDURE — 71045 X-RAY EXAM CHEST 1 VIEW: CPT | Performed by: EMERGENCY MEDICINE

## 2023-07-12 PROCEDURE — 96376 TX/PRO/DX INJ SAME DRUG ADON: CPT

## 2023-07-12 PROCEDURE — 96374 THER/PROPH/DIAG INJ IV PUSH: CPT

## 2023-07-12 PROCEDURE — 73502 X-RAY EXAM HIP UNI 2-3 VIEWS: CPT | Performed by: EMERGENCY MEDICINE

## 2023-07-12 PROCEDURE — 85025 COMPLETE CBC W/AUTO DIFF WBC: CPT | Performed by: EMERGENCY MEDICINE

## 2023-07-12 PROCEDURE — 96361 HYDRATE IV INFUSION ADD-ON: CPT

## 2023-07-12 PROCEDURE — 70450 CT HEAD/BRAIN W/O DYE: CPT | Performed by: EMERGENCY MEDICINE

## 2023-07-12 PROCEDURE — 99285 EMERGENCY DEPT VISIT HI MDM: CPT

## 2023-07-12 PROCEDURE — 80053 COMPREHEN METABOLIC PANEL: CPT | Performed by: EMERGENCY MEDICINE

## 2023-07-12 PROCEDURE — 99291 CRITICAL CARE FIRST HOUR: CPT

## 2023-07-12 RX ORDER — HYDROMORPHONE HYDROCHLORIDE 1 MG/ML
1 INJECTION, SOLUTION INTRAMUSCULAR; INTRAVENOUS; SUBCUTANEOUS ONCE
Status: COMPLETED | OUTPATIENT
Start: 2023-07-12 | End: 2023-07-12

## 2023-07-12 NOTE — CM/SW NOTE
0422:  Received call from Dr. Devang Lilly requesting [facility], [facility]. to assist with transferring patient to a Level [address] for a closed comminuted intertrochanteric fracture of right femur. Per Dr. Devang Lilly she spoke with [facility] on call and they advised her patient requires higher level of care to a Level [address] as we are not equip to handle the above fracture at [address]:  ERCM called and spoke with Mady Crenshaw at [facility] - per Mady Crenshaw the above transfer would be sent to ortho and would be an ER to ER transfer and they are unable to take any ER to ER transfers at this time. 12:  ERCM called and spoke with Masha at [facility] to attempt to get patient transferred to [facility] - per Masha she will need to reach out to Ortho service and if Ortho service agreeable to an ER to ER transfer they will be able to accommodate the patient, however if patient needs floor bed they have no available beds at this time. ERCM transferred Masha to speak with Dr. Devang Lilly. ERCM also faxed pt's face sheet to Masha @ [phone] via Right Fax - confirmation rec'd. ERCM will await update from Masha or Dr. Dveang Lilly on if they are able to accept patient and if not this ERCM will continue to reach out to other Level 1 Trauma Centers. 4537:  ERCM called and spoke with Aury Vargas in [address] [place] [address] Radiology and informed her to please print pt's PCXR, Right Femur XR and Right Hip XR onto CD and take it to RICK Torres ER RN. Per Aury Vargas patient is going to have Head CT shortly and she will await Head CT results and then print all of the above 3 exams and Head CT [facility].     1270:  ERCM called [facility] for update - per Karen August at [address] \"we are still waiting for our Ortho MD to call back we will call you some updates in just a little bit. \"    5400:  [facility], [facility]. called [facility] for update - per Masha \"I am still trying to reach our Ortho MD.\"     0530:  ERCM called and spoke with Veronica Lyona at [facility] transfer Center whom transferred [facility]. to speak with Cristiana Chopra at [facility] informed of patient - transferred Cristiana Chopra and Mariah Silva MD to speak with Dr. Susan Gleason for MD to MD report. Will update from [facility].    0536:  Face sheet faxed to Cristiana Chopra at [facility] via Right Fax @ [phone] - confirmation rec'd. [facility] calling back - per Cristiana Chopra patient has been accepted by Trauma Surgeon Dr. Zohreh Sanchez to the Adult ER at [facility] [address] [facility] and [address] [place] [address] located at [address] [place] [address]. [address]. Ok per Cristiana Chopra for [facility], [facility]. to arrange transport to [facility] now. Per Cristiana Chopra she provided Dr. Susan Gleason with RN to RN#.     0910:  [facility] called - ALS arranged - ETA 0630. PCS completed in [facility]. 0:  Melissa,[facility] ER RN informed of all of the above. ERC ensured Melissa,ER RN has RN to RN# to call report to [facility] Adult ER. Per Dann Olszewski RN she does have RN to RN# and she is going to call report now. [facility] also informed Filippo Levin ER RN to ensure copy of chart and Radiology CD is sent with patient upon transfer - [address] [place] [address] v/u and states Cecilia Mckeoner are working on CD now. \"    1484:  [facility], [facility]. called and spoke with Blanquita Dykes at [facility] called and informed they can cancel our previous transfer request as we have another accepting facility. Blanquita Dykes v/u and will update Masha on the above.

## 2023-07-12 NOTE — ED INITIAL ASSESSMENT (HPI)
Pt arrives via EMS for c/o a fall at 0210 after falling with his walker. Pt did not hit his head or lose consciousness. R leg shortened and rotated, pt c/o pain 5/10.

## 2023-08-03 ENCOUNTER — TELEPHONE (OUTPATIENT)
Dept: CARDIOLOGY | Age: 83
End: 2023-08-03

## 2023-08-07 ENCOUNTER — OFFICE VISIT (OUTPATIENT)
Dept: CARDIOLOGY | Age: 83
End: 2023-08-07

## 2023-08-07 VITALS
HEART RATE: 87 BPM | SYSTOLIC BLOOD PRESSURE: 129 MMHG | WEIGHT: 144 LBS | DIASTOLIC BLOOD PRESSURE: 82 MMHG | BODY MASS INDEX: 21.82 KG/M2 | HEIGHT: 68 IN

## 2023-08-07 DIAGNOSIS — I10 HYPERTENSION, BENIGN: ICD-10-CM

## 2023-08-07 DIAGNOSIS — I71.21 ANEURYSM OF ASCENDING AORTA WITHOUT RUPTURE (CMD): Primary | ICD-10-CM

## 2023-08-07 DIAGNOSIS — Q23.1 BICUSPID AORTIC VALVE: ICD-10-CM

## 2023-08-07 DIAGNOSIS — I48.0 PAROXYSMAL ATRIAL FIBRILLATION (CMD): ICD-10-CM

## 2023-08-07 DIAGNOSIS — I35.0 NONRHEUMATIC AORTIC VALVE STENOSIS: ICD-10-CM

## 2023-08-07 DIAGNOSIS — Z95.2 HISTORY OF AORTIC VALVE REPLACEMENT: ICD-10-CM

## 2023-08-07 PROCEDURE — 99214 OFFICE O/P EST MOD 30 MIN: CPT | Performed by: INTERNAL MEDICINE

## 2023-08-07 RX ORDER — HYDROMORPHONE HYDROCHLORIDE 2 MG/1
2 TABLET ORAL
COMMUNITY
Start: 2023-08-01

## 2023-08-07 RX ORDER — PANTOPRAZOLE SODIUM 40 MG/1
TABLET, DELAYED RELEASE ORAL
COMMUNITY
Start: 2023-08-01

## 2023-08-07 RX ORDER — ENOXAPARIN SODIUM 100 MG/ML
40 INJECTION SUBCUTANEOUS
COMMUNITY
Start: 2023-07-18 | End: 2023-08-17

## 2023-08-07 RX ORDER — BACLOFEN 10 MG/1
10 TABLET ORAL
COMMUNITY
Start: 2023-07-18

## 2023-08-07 RX ORDER — CARVEDILOL 3.12 MG/1
TABLET ORAL
COMMUNITY
Start: 2023-08-01

## 2023-08-07 SDOH — HEALTH STABILITY: PHYSICAL HEALTH: ON AVERAGE, HOW MANY DAYS PER WEEK DO YOU ENGAGE IN MODERATE TO STRENUOUS EXERCISE (LIKE A BRISK WALK)?: 0 DAYS

## 2023-08-07 ASSESSMENT — PATIENT HEALTH QUESTIONNAIRE - PHQ9
SUM OF ALL RESPONSES TO PHQ9 QUESTIONS 1 AND 2: 0
CLINICAL INTERPRETATION OF PHQ2 SCORE: NO FURTHER SCREENING NEEDED
1. LITTLE INTEREST OR PLEASURE IN DOING THINGS: NOT AT ALL
SUM OF ALL RESPONSES TO PHQ9 QUESTIONS 1 AND 2: 0
2. FEELING DOWN, DEPRESSED OR HOPELESS: NOT AT ALL

## 2023-11-17 ENCOUNTER — EXTERNAL LAB (OUTPATIENT)
Dept: CARDIOLOGY | Age: 83
End: 2023-11-17

## 2023-11-17 LAB
ABSOLUTE IMMATURE GRANULOCYTES (OFFPRE24): 0 10'3/UL (ref 0–0.1)
ABSOLUTE NRBC (AUTO): 0 10'3/UL
ALBUMIN SERPL-MCNC: 4.5 G/DL (ref 3.5–5)
ALP SERPL-CCNC: 59 UNITS/L
ALT SERPL-CCNC: 7 UNITS/L
ANION GAP SERPL CALC-SCNC: 10 MMOL/L (ref 4–13)
AST SERPL-CCNC: 10 UNITS/L (ref 13–39)
BASOPHILS # BLD: 0 10'3/UL (ref 0–0.1)
BASOPHILS NFR BLD: 0.4 % (ref 0–2)
BILIRUB SERPL-MCNC: 0.6 MG/DL (ref 0.2–1.2)
BUN SERPL-MCNC: 22 MG/DL (ref 7–25)
CALCIUM SERPL-MCNC: 9.4 MG/DL (ref 8.3–10.5)
CHLORIDE SERPL-SCNC: 101 MMOL/L (ref 98–107)
CO2 SERPL-SCNC: 26 MMOL/L (ref 21–31)
CREAT SERPL-MCNC: 0.78 MG/DL
EOSINOPHIL # BLD: 0.1 10'3/UL (ref 0–0.6)
EOSINOPHIL NFR BLD: 0.8 % (ref 0–9)
ERYTHROCYTE [DISTWIDTH] IN BLOOD: 17.3 % (ref 11–15)
GFR SERPLBLD SCHWARTZ-ARVRAT: 89 ML/MIN/1.73M2
GLUCOSE SERPL-MCNC: 108 MG/DL (ref 70–100)
HCT VFR BLD CALC: 38.8 %
HGB BLD-MCNC: 13 G/DL
IMMATURE GRANULOCYTES (OFFPRE25): 0.5 %
LENGTH OF FAST TIME PATIENT: ABNORMAL H
LYMPHOCYTES # BLD: 1.9 10'3/UL (ref 0.7–3.4)
LYMPHOCYTES NFR BLD: 25 % (ref 16–48)
MCH RBC QN AUTO: 31.3 PG (ref 27–33)
MCHC RBC AUTO-ENTMCNC: 33.5 G/DL (ref 32–36)
MCV RBC AUTO: 93.5 FL (ref 82–99)
MONOCYTES # BLD: 1.2 10'3/UL (ref 0.3–1)
MONOCYTES NFR BLD: 16.7 % (ref 4–14)
MPV (OFFPRE2): 9.7 FL (ref 9.8–12.7)
NEUTROPHILS # BLD: 4.2 10'3/UL (ref 1.1–5)
NEUTROPHILS NFR BLD: 56.6 % (ref 37–72)
NRBC BLD MANUAL-RTO: 0 %
PLATELET # BLD: 241 10'3/UL (ref 150–450)
POTASSIUM SERPL-SCNC: 4.1 MMOL/L (ref 3.5–5.1)
PROT SERPL-MCNC: 6.3 G/DL (ref 6.4–8.3)
RBC # BLD: 4.15 10'6/UL
SODIUM SERPL-SCNC: 137 MMOL/L (ref 133–146)
WBC # BLD: 7.4 10'3/UL (ref 3.6–10.2)

## 2023-11-22 ENCOUNTER — APPOINTMENT (OUTPATIENT)
Dept: CARDIOLOGY | Age: 83
End: 2023-11-22

## 2023-11-22 VITALS
WEIGHT: 143 LBS | HEIGHT: 68 IN | HEART RATE: 79 BPM | BODY MASS INDEX: 21.67 KG/M2 | DIASTOLIC BLOOD PRESSURE: 78 MMHG | SYSTOLIC BLOOD PRESSURE: 118 MMHG

## 2023-11-22 DIAGNOSIS — I71.21 ASCENDING AORTIC ANEURYSM, UNSPECIFIED WHETHER RUPTURED (CMD): Primary | ICD-10-CM

## 2023-11-22 DIAGNOSIS — Z95.2 HISTORY OF AORTIC VALVE REPLACEMENT: ICD-10-CM

## 2023-11-22 DIAGNOSIS — I35.0 NONRHEUMATIC AORTIC VALVE STENOSIS: ICD-10-CM

## 2023-11-22 DIAGNOSIS — I10 HYPERTENSION, BENIGN: ICD-10-CM

## 2023-11-22 DIAGNOSIS — Q23.1 BICUSPID AORTIC VALVE: ICD-10-CM

## 2023-11-22 DIAGNOSIS — I48.0 PAROXYSMAL ATRIAL FIBRILLATION (CMD): ICD-10-CM

## 2023-11-22 PROCEDURE — 99214 OFFICE O/P EST MOD 30 MIN: CPT | Performed by: INTERNAL MEDICINE

## 2023-11-22 SDOH — HEALTH STABILITY: PHYSICAL HEALTH: ON AVERAGE, HOW MANY DAYS PER WEEK DO YOU ENGAGE IN MODERATE TO STRENUOUS EXERCISE (LIKE A BRISK WALK)?: 0 DAYS

## 2023-11-22 SDOH — HEALTH STABILITY: PHYSICAL HEALTH: ON AVERAGE, HOW MANY MINUTES DO YOU ENGAGE IN EXERCISE AT THIS LEVEL?: 0 MIN

## 2023-11-22 ASSESSMENT — PATIENT HEALTH QUESTIONNAIRE - PHQ9
SUM OF ALL RESPONSES TO PHQ9 QUESTIONS 1 AND 2: 0
CLINICAL INTERPRETATION OF PHQ2 SCORE: NO FURTHER SCREENING NEEDED
SUM OF ALL RESPONSES TO PHQ9 QUESTIONS 1 AND 2: 0
1. LITTLE INTEREST OR PLEASURE IN DOING THINGS: NOT AT ALL
2. FEELING DOWN, DEPRESSED OR HOPELESS: NOT AT ALL

## 2023-11-27 ENCOUNTER — APPOINTMENT (OUTPATIENT)
Dept: CARDIOLOGY | Age: 83
End: 2023-11-27

## 2023-12-08 RX ORDER — DIGOXIN 125 MCG
125 TABLET ORAL DAILY
Qty: 90 TABLET | Refills: 3 | Status: SHIPPED | OUTPATIENT
Start: 2023-12-08

## 2024-05-13 ENCOUNTER — EXTERNAL LAB (OUTPATIENT)
Dept: HEALTH INFORMATION MANAGEMENT | Facility: OTHER | Age: 84
End: 2024-05-13

## 2024-05-13 LAB
ALBUMIN SERPL-MCNC: 4.3 G/DL (ref 3.5–5)
ALP SERPL-CCNC: 69 UNITS/L
ALT SERPL-CCNC: 10 UNITS/L
ANION GAP SERPL CALC-SCNC: 8 MMOL/L (ref 4–13)
AST SERPL-CCNC: 12 UNITS/L (ref 13–39)
BASOPHILS # BLD: 0 10^3/UL (ref 0–0.3)
BASOPHILS NFR BLD: 0.5 % (ref 0–2)
BILIRUB SERPL-MCNC: 0.4 MG/DL (ref 0.2–1.2)
BUN SERPL-MCNC: 20 MG/DL (ref 7–25)
CALCIUM SERPL-MCNC: 9 MG/DL (ref 8.3–10.5)
CHLORIDE SERPL-SCNC: 103 MMOL/L (ref 98–107)
CHOLEST SERPL-MCNC: 159 MG/DL (ref 0–199)
CHOLEST/HDLC SERPL: 3.8 {RATIO} (ref 0–5)
CO2 SERPL-SCNC: 27 MMOL/L (ref 21–31)
CREAT SERPL-MCNC: 0.85 MG/DL
EOSINOPHIL # BLD: 0.1 10^3/UL (ref 0–0.6)
EOSINOPHIL NFR BLD: 1.2 % (ref 0–8)
ERYTHROCYTE [DISTWIDTH] IN BLOOD: 14.4 % (ref 11–15)
GFR SERPLBLD SCHWARTZ-ARVRAT: 87 ML/MIN/1.73 M2
GLUCOSE SERPL-MCNC: 93 MG/DL (ref 70–100)
HCT VFR BLD CALC: 40.6 %
HDLC SERPL-MCNC: 42 MG/DL
HGB BLD-MCNC: 13.4 G/DL
IMM GRANULOCYTES # BLD: 0 10^3/UL (ref 0–0.1)
IMM GRANULOCYTES NFR BLD: 0.2 %
LDLC SERPL CALC-MCNC: 101 MG/DL (ref 0–99)
LENGTH OF FAST TIME PATIENT: ABNORMAL H
LENGTH OF FAST TIME PATIENT: ABNORMAL H
LYMPHOCYTES # BLD: 1.8 10^3/UL (ref 1–4)
LYMPHOCYTES NFR BLD: 30.9 % (ref 15–50)
MCH RBC QN AUTO: 31.8 PG (ref 27–34)
MCHC RBC AUTO-ENTMCNC: 33 G/DL (ref 32–35.5)
MCV RBC AUTO: 96.2 FL (ref 80–99)
MONOCYTES # BLD: 1.1 10^3/UL (ref 0.2–1)
MONOCYTES NFR BLD: 19.1 % (ref 1–15)
NEUTROPHILS # BLD: 2.9 10^3/UL (ref 1.5–8)
NEUTROPHILS NFR BLD: 48.1 % (ref 34–73)
NONHDLC SERPL-MCNC: 117 MG/DL
NRBC # BLD: 0 10^3/UL
NRBC BLD MANUAL-RTO: 0 %
PLATELET # BLD: 201 10^3/UL (ref 150–400)
PMV BLD AUTO: 11 FL (ref 8.8–12.1)
POTASSIUM SERPL-SCNC: 4.4 MMOL/L (ref 3.5–5.1)
PROT SERPL-MCNC: 6.2 G/DL (ref 6.4–8.3)
RBC # BLD: 4.22 10^6/UL
SODIUM SERPL-SCNC: 138 MMOL/L (ref 133–146)
TRIGL SERPL-MCNC: 73 MG/DL (ref 0–150)
WBC # BLD: 5.9 10^3/UL (ref 3.5–10.5)

## 2024-05-15 ENCOUNTER — APPOINTMENT (OUTPATIENT)
Dept: CARDIOLOGY | Age: 84
End: 2024-05-15
Attending: INTERNAL MEDICINE

## 2024-05-15 DIAGNOSIS — Z95.2 HISTORY OF AORTIC VALVE REPLACEMENT: ICD-10-CM

## 2024-05-15 DIAGNOSIS — I48.0 PAROXYSMAL ATRIAL FIBRILLATION  (CMD): ICD-10-CM

## 2024-05-15 DIAGNOSIS — I10 HYPERTENSION, BENIGN: ICD-10-CM

## 2024-05-15 DIAGNOSIS — I35.0 NONRHEUMATIC AORTIC VALVE STENOSIS: ICD-10-CM

## 2024-05-15 DIAGNOSIS — I71.21 ASCENDING AORTIC ANEURYSM, UNSPECIFIED WHETHER RUPTURED (CMD): ICD-10-CM

## 2024-05-15 DIAGNOSIS — Q23.1 BICUSPID AORTIC VALVE (CMD): ICD-10-CM

## 2024-05-15 LAB
AORTIC VALVE AREA (AVA): 0.73
ASCENDING AORTA (AAD): 5
AV PEAK GRADIENT (AVPG): 15
AV PEAK VELOCITY (AVPV): 1.92
AV STENOSIS SEVERITY TEXT: NORMAL
AVI LVOT PEAK GRADIENT (LVOTMG): 1.1
E WAVE DECELARATION TIME (MDT): 13.33
INTERVENTRICULAR SEPTUM IN END DIASTOLE (IVSD): 1.98
LEFT INTERNAL DIMENSION IN SYSTOLE (LVSD): 1.2
LEFT VENTRICULAR INTERNAL DIMENSION IN DIASTOLE (LVDD): 3.9
LEFT VENTRICULAR POSTERIOR WALL IN END DIASTOLE (LVPW): 5.3
LV EF: NORMAL %
LVOT VTI (LVOTVTI): 0.99
MV E TISSUE VEL LAT (MELV): 0.68
MV E TISSUE VEL MED (MESV): 11
MV E WAVE VEL/E TISSUE VEL MED(MSR): 5.5
MV PEAK A VELOCITY (MVPAV): 68
RV END SYSTOLIC LONGITUDINAL STRAIN FREE WALL (RVGS): 2.5
TRICUSPID VALVE ANNULAR PEAK VELOCITY (TVAPV): 22
TRICUSPID VALVE PEAK REGURGITATION VELOCITY (TRPV): 4.7
TV ESTIMATED RIGHT ARTERIAL PRESSURE (RAP): 7.63

## 2024-05-15 PROCEDURE — 93306 TTE W/DOPPLER COMPLETE: CPT | Performed by: INTERNAL MEDICINE

## 2024-05-22 ENCOUNTER — APPOINTMENT (OUTPATIENT)
Dept: CARDIOLOGY | Age: 84
End: 2024-05-22

## 2024-05-22 VITALS
HEIGHT: 68 IN | WEIGHT: 152 LBS | BODY MASS INDEX: 23.04 KG/M2 | HEART RATE: 76 BPM | SYSTOLIC BLOOD PRESSURE: 129 MMHG | DIASTOLIC BLOOD PRESSURE: 86 MMHG

## 2024-05-22 DIAGNOSIS — I71.21 ANEURYSM OF ASCENDING AORTA WITHOUT RUPTURE (CMD): Primary | ICD-10-CM

## 2024-05-22 DIAGNOSIS — I35.0 NONRHEUMATIC AORTIC VALVE STENOSIS: ICD-10-CM

## 2024-05-22 DIAGNOSIS — I10 HYPERTENSION, BENIGN: ICD-10-CM

## 2024-05-22 DIAGNOSIS — Q23.1 BICUSPID AORTIC VALVE (CMD): ICD-10-CM

## 2024-05-22 DIAGNOSIS — I48.0 PAROXYSMAL ATRIAL FIBRILLATION  (CMD): ICD-10-CM

## 2024-05-22 DIAGNOSIS — Z95.2 HISTORY OF AORTIC VALVE REPLACEMENT: ICD-10-CM

## 2024-05-22 SDOH — HEALTH STABILITY: PHYSICAL HEALTH: ON AVERAGE, HOW MANY MINUTES DO YOU ENGAGE IN EXERCISE AT THIS LEVEL?: 30 MIN

## 2024-05-22 SDOH — HEALTH STABILITY: PHYSICAL HEALTH: ON AVERAGE, HOW MANY DAYS PER WEEK DO YOU ENGAGE IN MODERATE TO STRENUOUS EXERCISE (LIKE A BRISK WALK)?: 7 DAYS

## 2024-06-06 RX ORDER — CARVEDILOL 3.12 MG/1
3.12 TABLET ORAL 2 TIMES DAILY WITH MEALS
Qty: 180 TABLET | Refills: 3 | Status: SHIPPED | OUTPATIENT
Start: 2024-06-06

## 2024-11-20 ENCOUNTER — APPOINTMENT (OUTPATIENT)
Dept: CARDIOLOGY | Age: 84
End: 2024-11-20

## 2024-11-25 ENCOUNTER — APPOINTMENT (OUTPATIENT)
Dept: CARDIOLOGY | Age: 84
End: 2024-11-25

## 2024-11-25 VITALS
HEIGHT: 68 IN | DIASTOLIC BLOOD PRESSURE: 75 MMHG | HEART RATE: 76 BPM | SYSTOLIC BLOOD PRESSURE: 149 MMHG | BODY MASS INDEX: 23.19 KG/M2 | WEIGHT: 153 LBS

## 2024-11-25 DIAGNOSIS — Z95.2 HISTORY OF AORTIC VALVE REPLACEMENT: ICD-10-CM

## 2024-11-25 DIAGNOSIS — I10 HYPERTENSION, BENIGN: ICD-10-CM

## 2024-11-25 DIAGNOSIS — Q23.81 BICUSPID AORTIC VALVE: ICD-10-CM

## 2024-11-25 DIAGNOSIS — I48.0 PAROXYSMAL ATRIAL FIBRILLATION  (CMD): ICD-10-CM

## 2024-11-25 DIAGNOSIS — I71.21 ASCENDING AORTIC ANEURYSM, UNSPECIFIED WHETHER RUPTURED (CMD): Primary | ICD-10-CM

## 2024-11-25 DIAGNOSIS — I35.0 NONRHEUMATIC AORTIC VALVE STENOSIS: ICD-10-CM

## 2024-11-25 SDOH — HEALTH STABILITY: PHYSICAL HEALTH: ON AVERAGE, HOW MANY DAYS PER WEEK DO YOU ENGAGE IN MODERATE TO STRENUOUS EXERCISE (LIKE A BRISK WALK)?: 7 DAYS

## 2024-11-25 SDOH — HEALTH STABILITY: PHYSICAL HEALTH: ON AVERAGE, HOW MANY MINUTES DO YOU ENGAGE IN EXERCISE AT THIS LEVEL?: 30 MIN

## 2024-11-25 ASSESSMENT — PATIENT HEALTH QUESTIONNAIRE - PHQ9
2. FEELING DOWN, DEPRESSED OR HOPELESS: NOT AT ALL
SUM OF ALL RESPONSES TO PHQ9 QUESTIONS 1 AND 2: 0
CLINICAL INTERPRETATION OF PHQ2 SCORE: NO FURTHER SCREENING NEEDED
1. LITTLE INTEREST OR PLEASURE IN DOING THINGS: NOT AT ALL
SUM OF ALL RESPONSES TO PHQ9 QUESTIONS 1 AND 2: 0

## 2025-06-09 RX ORDER — CARVEDILOL 3.12 MG/1
3.12 TABLET ORAL 2 TIMES DAILY WITH MEALS
Qty: 180 TABLET | Refills: 3 | Status: SHIPPED | OUTPATIENT
Start: 2025-06-09

## 2025-06-11 RX ORDER — CARVEDILOL 3.12 MG/1
3.12 TABLET ORAL 2 TIMES DAILY WITH MEALS
Qty: 180 TABLET | Refills: 3 | OUTPATIENT
Start: 2025-06-11

## 2025-06-13 NOTE — PLAN OF CARE
Safety Risk - Non-Violent Restraints    • Patient will remain free from self-harm Completed          CARDIOVASCULAR - ADULT    • Maintains optimal cardiac output and hemodynamic stability Progressing    • Absence of cardiac arrhythmias or at baseline Progr [No Acute Distress] : no acute distress [Well Nourished] : well nourished [Well Developed] : well developed [Well-Appearing] : well-appearing [Normal Sclera/Conjunctiva] : normal sclera/conjunctiva [Normal Outer Ear/Nose] : the outer ears and nose were normal in appearance [Normal Oropharynx] : the oropharynx was normal [No JVD] : no jugular venous distention [No Lymphadenopathy] : no lymphadenopathy [Supple] : supple [No Respiratory Distress] : no respiratory distress  [No Accessory Muscle Use] : no accessory muscle use [Clear to Auscultation] : lungs were clear to auscultation bilaterally [Normal Rate] : normal rate  [Regular Rhythm] : with a regular rhythm [Normal S1, S2] : normal S1 and S2 [Pedal Pulses Present] : the pedal pulses are present [No Edema] : there was no peripheral edema [No Extremity Clubbing/Cyanosis] : no extremity clubbing/cyanosis [Soft] : abdomen soft [Non Tender] : non-tender [Non-distended] : non-distended [Normal Posterior Cervical Nodes] : no posterior cervical lymphadenopathy [Normal Anterior Cervical Nodes] : no anterior cervical lymphadenopathy [No CVA Tenderness] : no CVA  tenderness [No Spinal Tenderness] : no spinal tenderness [No Joint Swelling] : no joint swelling [Grossly Normal Strength/Tone] : grossly normal strength/tone [No Rash] : no rash [Coordination Grossly Intact] : coordination grossly intact [No Focal Deficits] : no focal deficits [Normal Gait] : normal gait [Normal Affect] : the affect was normal [Normal Insight/Judgement] : insight and judgment were intact

## 2025-06-16 ENCOUNTER — APPOINTMENT (OUTPATIENT)
Dept: CARDIOLOGY | Age: 85
End: 2025-06-16

## 2025-06-16 VITALS
SYSTOLIC BLOOD PRESSURE: 148 MMHG | WEIGHT: 149 LBS | DIASTOLIC BLOOD PRESSURE: 87 MMHG | BODY MASS INDEX: 22.58 KG/M2 | HEART RATE: 64 BPM | HEIGHT: 68 IN

## 2025-06-16 DIAGNOSIS — I35.0 NONRHEUMATIC AORTIC VALVE STENOSIS: ICD-10-CM

## 2025-06-16 DIAGNOSIS — I10 HYPERTENSION, BENIGN: ICD-10-CM

## 2025-06-16 DIAGNOSIS — Z95.2 HISTORY OF AORTIC VALVE REPLACEMENT: ICD-10-CM

## 2025-06-16 DIAGNOSIS — I71.21 ANEURYSM OF ASCENDING AORTA WITHOUT RUPTURE (CMD): Primary | ICD-10-CM

## 2025-06-16 DIAGNOSIS — Q23.81 BICUSPID AORTIC VALVE: ICD-10-CM

## 2025-06-16 DIAGNOSIS — I48.0 PAROXYSMAL ATRIAL FIBRILLATION  (CMD): ICD-10-CM

## 2025-06-16 PROCEDURE — 99214 OFFICE O/P EST MOD 30 MIN: CPT | Performed by: INTERNAL MEDICINE

## 2025-06-18 ENCOUNTER — LAB SERVICES (OUTPATIENT)
Dept: LAB | Age: 85
End: 2025-06-18

## 2025-06-18 DIAGNOSIS — Z95.2 HISTORY OF AORTIC VALVE REPLACEMENT: ICD-10-CM

## 2025-06-18 DIAGNOSIS — I10 HYPERTENSION, BENIGN: ICD-10-CM

## 2025-06-18 DIAGNOSIS — I35.0 NONRHEUMATIC AORTIC VALVE STENOSIS: ICD-10-CM

## 2025-06-18 DIAGNOSIS — Q23.81 BICUSPID AORTIC VALVE: ICD-10-CM

## 2025-06-18 DIAGNOSIS — I71.21 ANEURYSM OF ASCENDING AORTA WITHOUT RUPTURE (CMD): ICD-10-CM

## 2025-06-18 DIAGNOSIS — I48.0 PAROXYSMAL ATRIAL FIBRILLATION  (CMD): ICD-10-CM

## 2025-06-19 LAB
ALBUMIN SERPL-MCNC: 3.9 G/DL (ref 3.4–5)
ALBUMIN/GLOB SERPL: 1.8 {RATIO} (ref 1–2.4)
ALP SERPL-CCNC: 47 UNITS/L (ref 45–117)
ALT SERPL-CCNC: 15 UNITS/L
ANION GAP SERPL CALC-SCNC: 9 MMOL/L (ref 7–19)
AST SERPL-CCNC: 8 UNITS/L
BILIRUB SERPL-MCNC: 0.5 MG/DL (ref 0.2–1)
BUN SERPL-MCNC: 15 MG/DL (ref 6–20)
BUN/CREAT SERPL: 24 (ref 7–25)
CALCIUM SERPL-MCNC: 8.9 MG/DL (ref 8.4–10.2)
CHLORIDE SERPL-SCNC: 106 MMOL/L (ref 97–110)
CHOLEST SERPL-MCNC: 157 MG/DL
CHOLEST/HDLC SERPL: 3.5 {RATIO}
CO2 SERPL-SCNC: 27 MMOL/L (ref 21–32)
CREAT SERPL-MCNC: 0.62 MG/DL (ref 0.67–1.17)
DEPRECATED RDW RBC: 49.8 FL (ref 39–50)
EGFRCR SERPLBLD CKD-EPI 2021: >90 ML/MIN/{1.73_M2}
ERYTHROCYTE [DISTWIDTH] IN BLOOD: 14.1 % (ref 11–15)
FASTING DURATION TIME PATIENT: ABNORMAL H
GLOBULIN SER-MCNC: 2.2 G/DL (ref 2–4)
GLUCOSE SERPL-MCNC: 97 MG/DL (ref 70–99)
HCT VFR BLD CALC: 40.5 % (ref 39–51)
HDLC SERPL-MCNC: 45 MG/DL
HGB BLD-MCNC: 13.7 G/DL (ref 13–17)
LDLC SERPL CALC-MCNC: 98 MG/DL
MCH RBC QN AUTO: 32.8 PG (ref 26–34)
MCHC RBC AUTO-ENTMCNC: 33.8 G/DL (ref 32–36.5)
MCV RBC AUTO: 96.9 FL (ref 78–100)
NONHDLC SERPL-MCNC: 112 MG/DL
NRBC BLD MANUAL-RTO: 0 /100 WBC
PLATELET # BLD AUTO: 184 K/MCL (ref 140–450)
POTASSIUM SERPL-SCNC: 4 MMOL/L (ref 3.4–5.1)
PROT SERPL-MCNC: 6.1 G/DL (ref 6.4–8.2)
RBC # BLD: 4.18 MIL/MCL (ref 4.5–5.9)
SODIUM SERPL-SCNC: 138 MMOL/L (ref 135–145)
TRIGL SERPL-MCNC: 72 MG/DL
WBC # BLD: 5.1 K/MCL (ref 4.2–11)

## 2025-06-23 ENCOUNTER — RESULTS FOLLOW-UP (OUTPATIENT)
Dept: CARDIOLOGY | Age: 85
End: 2025-06-23

## 2025-12-17 ENCOUNTER — APPOINTMENT (OUTPATIENT)
Dept: CARDIOLOGY | Age: 85
End: 2025-12-17
Attending: INTERNAL MEDICINE

## (undated) DEVICE — Device: Brand: DEFENDO AIR/WATER/SUCTION AND BIOPSY VALVE

## (undated) DEVICE — SUTURE PROLENE 4-0 V-5

## (undated) DEVICE — SUTURE PROLENE 3-0 V-7

## (undated) DEVICE — SOL LACT RINGERS 1000ML

## (undated) DEVICE — 3M™ RED DOT™ MONITORING ELECTRODE WITH FOAM TAPE AND STICKY GEL, 50/BAG, 20/CASE, 72/PLT 2570: Brand: RED DOT™

## (undated) DEVICE — OPEN HEART: Brand: MEDLINE INDUSTRIES, INC.

## (undated) DEVICE — PAD ELECTRODE MULTIRADIO ADLT

## (undated) DEVICE — FILTERLINE NASAL ADULT O2/CO2

## (undated) DEVICE — SUTURE POLYDEK 2-0

## (undated) DEVICE — CELL SAVER BAG 600ML 4R2023

## (undated) DEVICE — 3M™ TEGADERM™ TRANSPARENT FILM DRESSING, 1626W, 4 IN X 4-3/4 IN (10 CM X 12 CM), 50 EACH/CARTON, 4 CARTON/CASE: Brand: 3M™ TEGADERM™

## (undated) DEVICE — MEDI-VAC SUCTION HANDLE REGULAR CAPACITY: Brand: CARDINAL HEALTH

## (undated) DEVICE — CABLE PT WHITE

## (undated) DEVICE — Device

## (undated) DEVICE — CLAMP INSERT: Brand: STEALTH® CLAMP INSERT

## (undated) DEVICE — FIXED CORE WIRE GUIDE SAFE-T-J, CURVED: Brand: COOK

## (undated) DEVICE — TAPE UMBILICAL U11T

## (undated) DEVICE — CATH RED RUBBER 18FR

## (undated) DEVICE — LEAD TEMP PACING UNIPOLAR 6500

## (undated) DEVICE — ENDOSCOPY PACK UPPER: Brand: MEDLINE INDUSTRIES, INC.

## (undated) DEVICE — 1840 FOAM BLOCK NEEDLE COUNTER: Brand: DEVON

## (undated) DEVICE — SUTURE PROLENE 3-0 SH

## (undated) DEVICE — GLOVE SURG TRIUMPH SZ 8

## (undated) DEVICE — INTENT TO BE USED WITH SUTURE MATERIAL FOR TISSUE CLOSURE: Brand: RICHARD-ALLAN® NEEDLE 1/2 CIRCLE TAPER

## (undated) DEVICE — SUTURE SILK 2-0

## (undated) DEVICE — CABLE PT BLUE

## (undated) DEVICE — SUTURE SILK 0 FSL

## (undated) DEVICE — CELL SAVER 5/5+ BOWL KIT-225ML: Brand: HAEMONETICS CELL SAVER 5/5+ SYSTEMS

## (undated) DEVICE — TRANSPOSAL ULTRAFLEX DUO/QUAD ULTRA CART MANIFOLD

## (undated) DEVICE — 1200CC GUARDIAN II: Brand: GUARDIAN

## (undated) DEVICE — BLADE STERNAL SAW BULK PACK

## (undated) DEVICE — GAUZE SPONGES,12 PLY: Brand: CURITY

## (undated) DEVICE — SUTURE PROLENE 5-0 C-1

## (undated) DEVICE — CELL SAVER TUBING BRAT

## (undated) DEVICE — 3M™ BAIR HUGGER® CARDIAC ACCESS BLANKET, 5 PER CASE 63000: Brand: BAIR HUGGER™

## (undated) DEVICE — COR-KNOT® QUICK LOAD® SINGLES: Brand: COR-KNOT® QUICK LOAD®

## (undated) DEVICE — SUTURE POLYDEK 3-0 69-738

## (undated) DEVICE — SUTURE PROLENE 4-0 SH

## (undated) DEVICE — CELL SAVER RESERVOIR BRAT

## (undated) DEVICE — TIBURON DRAPE TOWELS: Brand: CONVERTORS

## (undated) DEVICE — SOL  .9 1000ML BTL

## (undated) DEVICE — #15 STERILE STAINLESS BLADE: Brand: STERILE STAINLESS BLADES

## (undated) DEVICE — BARD® PTFE FELT PLEDGETS, (OVAL), 4.8 MM X 6 MM: Brand: BARD® PTFE FELT PLEDGETS

## (undated) DEVICE — STERILE POLYISOPRENE POWDER-FREE SURGICAL GLOVES: Brand: PROTEXIS

## (undated) DEVICE — BLOWER CO2 MEDTRONIC/DLP 22150

## (undated) NOTE — LETTER
Cassius Quintero M.D., F.A.C.S. Uche Black M.D., F.A.C.S. Raudel Willams M.D., Fatmata Christie. LISA Mckeon M.D., F.A.C.S. Kay Amaya. Jessica Fajardo M.D., F.A.C.S. VANE Rivas M. Edwena Proud A.D. Luvenia End, M.D., F. chance to have all of your questions and concerns answered. If there are any issues which have not been adequately addressed, we ask you to bring them forward so that we can thoroughly address them.     A patient who is fully informed and understands their treatment, among other options and the risks and benefits of the different treatment options:    Yes _____ No _____    A [facility] surgeon as explained to me that if I should so desire, he/she is willing to explain my case and the surgical and non-surgical optio

## (undated) NOTE — ED AVS SNAPSHOT
Gene Hayden   MRN: [id number]    Department:  [facility] Emergency Department   Date of Visit:  8/12/2017           Disclosure     Insurance plans vary and the physician(s) referred by the ER may not be covered by your plan.  Please contact your If you have been prescribed any medication(s), please fill your prescription right away and begin taking the medication(s) as directed    If the emergency physician has read X-rays, these will be re-interpreted by a radiologist.  If there is a significant

## (undated) NOTE — LETTER
[facility]  Irma Randolph 62 [address] [facility], [address] [place] [address]    Consent for Operation    Date: __________________    Time: _______________    1.  I authorize the performance upon Janes Reddy the following operation:      aortic valve replacement, ascending procedure has been videotaped, the surgeon will obtain the original videotape. The hospital will not be responsible for storage or maintenance of this tape.     6. For the purpose of advancing medical education, I consent to the admittance of observers to t STATEMENTS REQUIRING INSERTION OR COMPLETION WERE FILLED IN.     Signature of Patient:   ___________________________    When the patient is a minor or mentally incompetent to give consent:  Signature of person authorized to consent for patient: ____________ drugs/illegal medications). Failure to inform my anesthesiologist about these medicines may increase my risk of anesthetic complications. · If I am allergic to anything or have had a reaction to anesthesia before.     3. I understand how the anesthesia med I have discussed the procedure and information above with the patient (or patient’s representative) and answered their questions. The patient or their representative has agreed to have anesthesia services.     _______________________________________________

## (undated) NOTE — LETTER
[address] [facility] FOR BLOOD OR BLOOD COMPONENTS      In the course of your treatment, it may become necessary to administer a transfusion of blood or blood components.  This form provides basic information concerning this proc explain the alternatives to you if it has not already been done. I,Amaury Mak, have read/had read to me the above. I understand the matters bearing on the decision whether or not to authorize a transfusion of blood or blood components.  I have no questi